# Patient Record
Sex: MALE | Race: WHITE | Employment: UNEMPLOYED | URBAN - METROPOLITAN AREA
[De-identification: names, ages, dates, MRNs, and addresses within clinical notes are randomized per-mention and may not be internally consistent; named-entity substitution may affect disease eponyms.]

---

## 2018-10-22 PROCEDURE — 96361 HYDRATE IV INFUSION ADD-ON: CPT

## 2018-10-28 ENCOUNTER — HOSPITAL ENCOUNTER (INPATIENT)
Age: 38
LOS: 3 days | Discharge: HOME | DRG: 641 | End: 2018-11-01
Attending: EMERGENCY MEDICINE | Admitting: INTERNAL MEDICINE
Payer: SELF-PAY

## 2018-10-28 DIAGNOSIS — R53.83 LETHARGY: Primary | ICD-10-CM

## 2018-10-28 DIAGNOSIS — R65.10 SIRS (SYSTEMIC INFLAMMATORY RESPONSE SYNDROME) (HCC): ICD-10-CM

## 2018-10-28 DIAGNOSIS — R41.0 DISORIENTATION: ICD-10-CM

## 2018-10-28 DIAGNOSIS — J04.0 LARYNGITIS: ICD-10-CM

## 2018-10-28 DIAGNOSIS — R41.82 ALTERED MENTAL STATUS, UNSPECIFIED ALTERED MENTAL STATUS TYPE: ICD-10-CM

## 2018-10-28 DIAGNOSIS — R47.1 DYSARTHRIA: ICD-10-CM

## 2018-10-28 DIAGNOSIS — E86.0 DEHYDRATION: ICD-10-CM

## 2018-10-28 DIAGNOSIS — C43.9 METASTATIC MELANOMA (HCC): ICD-10-CM

## 2018-10-28 LAB
ALBUMIN SERPL-MCNC: 3.2 G/DL (ref 3.5–5)
ALBUMIN/GLOB SERPL: 0.5 {RATIO} (ref 1.1–2.2)
ALP SERPL-CCNC: 82 U/L (ref 45–117)
ALT SERPL-CCNC: 19 U/L (ref 12–78)
ANION GAP SERPL CALC-SCNC: 14 MMOL/L (ref 5–15)
AST SERPL-CCNC: 32 U/L (ref 15–37)
BASOPHILS # BLD: 0.1 K/UL (ref 0–0.1)
BASOPHILS NFR BLD: 1 % (ref 0–1)
BILIRUB SERPL-MCNC: 0.7 MG/DL (ref 0.2–1)
BUN SERPL-MCNC: 18 MG/DL (ref 6–20)
BUN/CREAT SERPL: 19 (ref 12–20)
CALCIUM SERPL-MCNC: 12.2 MG/DL (ref 8.5–10.1)
CHLORIDE SERPL-SCNC: 96 MMOL/L (ref 97–108)
CO2 SERPL-SCNC: 21 MMOL/L (ref 21–32)
CREAT SERPL-MCNC: 0.93 MG/DL (ref 0.7–1.3)
DIFFERENTIAL METHOD BLD: ABNORMAL
EOSINOPHIL # BLD: 1.6 K/UL (ref 0–0.4)
EOSINOPHIL NFR BLD: 11 % (ref 0–7)
ERYTHROCYTE [DISTWIDTH] IN BLOOD BY AUTOMATED COUNT: 12.9 % (ref 11.5–14.5)
GLOBULIN SER CALC-MCNC: 6 G/DL (ref 2–4)
GLUCOSE SERPL-MCNC: 83 MG/DL (ref 65–100)
HCT VFR BLD AUTO: 37 % (ref 36.6–50.3)
HGB BLD-MCNC: 11.6 G/DL (ref 12.1–17)
IMM GRANULOCYTES # BLD: 0 K/UL (ref 0–0.04)
IMM GRANULOCYTES NFR BLD AUTO: 0 % (ref 0–0.5)
LYMPHOCYTES # BLD: 3.3 K/UL (ref 0.8–3.5)
LYMPHOCYTES NFR BLD: 23 % (ref 12–49)
MCH RBC QN AUTO: 26 PG (ref 26–34)
MCHC RBC AUTO-ENTMCNC: 31.4 G/DL (ref 30–36.5)
MCV RBC AUTO: 83 FL (ref 80–99)
MONOCYTES # BLD: 1.1 K/UL (ref 0–1)
MONOCYTES NFR BLD: 8 % (ref 5–13)
NEUTS SEG # BLD: 8.2 K/UL (ref 1.8–8)
NEUTS SEG NFR BLD: 57 % (ref 32–75)
NRBC # BLD: 0 K/UL (ref 0–0.01)
NRBC BLD-RTO: 0 PER 100 WBC
PLATELET # BLD AUTO: 633 K/UL (ref 150–400)
PMV BLD AUTO: 9 FL (ref 8.9–12.9)
POTASSIUM SERPL-SCNC: 4.2 MMOL/L (ref 3.5–5.1)
PROT SERPL-MCNC: 9.2 G/DL (ref 6.4–8.2)
RBC # BLD AUTO: 4.46 M/UL (ref 4.1–5.7)
RBC MORPH BLD: ABNORMAL
SODIUM SERPL-SCNC: 131 MMOL/L (ref 136–145)
WBC # BLD AUTO: 14.3 K/UL (ref 4.1–11.1)
WBC MORPH BLD: ABNORMAL

## 2018-10-28 PROCEDURE — 96361 HYDRATE IV INFUSION ADD-ON: CPT

## 2018-10-28 PROCEDURE — 96372 THER/PROPH/DIAG INJ SC/IM: CPT

## 2018-10-28 PROCEDURE — 96365 THER/PROPH/DIAG IV INF INIT: CPT

## 2018-10-28 PROCEDURE — 96375 TX/PRO/DX INJ NEW DRUG ADDON: CPT

## 2018-10-28 PROCEDURE — 96367 TX/PROPH/DG ADDL SEQ IV INF: CPT

## 2018-10-28 PROCEDURE — 85025 COMPLETE CBC W/AUTO DIFF WBC: CPT | Performed by: EMERGENCY MEDICINE

## 2018-10-28 PROCEDURE — 80053 COMPREHEN METABOLIC PANEL: CPT | Performed by: EMERGENCY MEDICINE

## 2018-10-28 PROCEDURE — 96366 THER/PROPH/DIAG IV INF ADDON: CPT

## 2018-10-28 PROCEDURE — 36415 COLL VENOUS BLD VENIPUNCTURE: CPT | Performed by: EMERGENCY MEDICINE

## 2018-10-28 PROCEDURE — 99285 EMERGENCY DEPT VISIT HI MDM: CPT

## 2018-10-29 ENCOUNTER — APPOINTMENT (OUTPATIENT)
Dept: GENERAL RADIOLOGY | Age: 38
DRG: 641 | End: 2018-10-29
Attending: EMERGENCY MEDICINE
Payer: SELF-PAY

## 2018-10-29 ENCOUNTER — APPOINTMENT (OUTPATIENT)
Dept: CT IMAGING | Age: 38
DRG: 641 | End: 2018-10-29
Attending: STUDENT IN AN ORGANIZED HEALTH CARE EDUCATION/TRAINING PROGRAM
Payer: SELF-PAY

## 2018-10-29 ENCOUNTER — APPOINTMENT (OUTPATIENT)
Dept: ULTRASOUND IMAGING | Age: 38
DRG: 641 | End: 2018-10-29
Attending: INTERNAL MEDICINE
Payer: SELF-PAY

## 2018-10-29 PROBLEM — R65.10 SIRS (SYSTEMIC INFLAMMATORY RESPONSE SYNDROME) (HCC): Status: ACTIVE | Noted: 2018-10-29

## 2018-10-29 LAB
AMPHET UR QL SCN: NEGATIVE
APPEARANCE UR: CLEAR
APTT PPP: 35.7 SEC (ref 22.1–32)
ATRIAL RATE: 104 BPM
BACTERIA URNS QL MICRO: NEGATIVE /HPF
BARBITURATES UR QL SCN: NEGATIVE
BENZODIAZ UR QL: NEGATIVE
BILIRUB UR QL CFM: NEGATIVE
CALCULATED P AXIS, ECG09: 41 DEGREES
CALCULATED R AXIS, ECG10: 28 DEGREES
CALCULATED T AXIS, ECG11: 28 DEGREES
CANNABINOIDS UR QL SCN: POSITIVE
COCAINE UR QL SCN: NEGATIVE
COLOR UR: ABNORMAL
DIAGNOSIS, 93000: NORMAL
DRUG SCRN COMMENT,DRGCM: ABNORMAL
EPITH CASTS URNS QL MICRO: ABNORMAL /LPF
FLUAV AG NPH QL IA: NEGATIVE
FLUBV AG NOSE QL IA: NEGATIVE
GLUCOSE UR STRIP.AUTO-MCNC: NEGATIVE MG/DL
HGB UR QL STRIP: NEGATIVE
HYALINE CASTS URNS QL MICRO: >20 /LPF (ref 0–5)
INR PPP: 1.5 (ref 0.9–1.1)
KETONES UR QL STRIP.AUTO: 40 MG/DL
LACTATE SERPL-SCNC: 0.8 MMOL/L (ref 0.4–2)
LACTATE SERPL-SCNC: 2.4 MMOL/L (ref 0.4–2)
LEUKOCYTE ESTERASE UR QL STRIP.AUTO: NEGATIVE
METHADONE UR QL: NEGATIVE
NITRITE UR QL STRIP.AUTO: NEGATIVE
OPIATES UR QL: POSITIVE
P-R INTERVAL, ECG05: 160 MS
PCP UR QL: NEGATIVE
PH UR STRIP: 5.5 [PH] (ref 5–8)
PROT UR STRIP-MCNC: NEGATIVE MG/DL
PROTHROMBIN TIME: 15.2 SEC (ref 9–11.1)
Q-T INTERVAL, ECG07: 342 MS
QRS DURATION, ECG06: 82 MS
QTC CALCULATION (BEZET), ECG08: 449 MS
RBC #/AREA URNS HPF: ABNORMAL /HPF (ref 0–5)
SP GR UR REFRACTOMETRY: 1.02 (ref 1–1.03)
THERAPEUTIC RANGE,PTTT: ABNORMAL SECS (ref 58–77)
UA: UC IF INDICATED,UAUC: ABNORMAL
UROBILINOGEN UR QL STRIP.AUTO: 1 EU/DL (ref 0.2–1)
VENTRICULAR RATE, ECG03: 104 BPM
WBC URNS QL MICRO: ABNORMAL /HPF (ref 0–4)

## 2018-10-29 PROCEDURE — 85610 PROTHROMBIN TIME: CPT | Performed by: INTERNAL MEDICINE

## 2018-10-29 PROCEDURE — 74011250636 HC RX REV CODE- 250/636: Performed by: EMERGENCY MEDICINE

## 2018-10-29 PROCEDURE — 95816 EEG AWAKE AND DROWSY: CPT | Performed by: PSYCHIATRY & NEUROLOGY

## 2018-10-29 PROCEDURE — 65660000000 HC RM CCU STEPDOWN

## 2018-10-29 PROCEDURE — 36415 COLL VENOUS BLD VENIPUNCTURE: CPT | Performed by: EMERGENCY MEDICINE

## 2018-10-29 PROCEDURE — 83605 ASSAY OF LACTIC ACID: CPT | Performed by: EMERGENCY MEDICINE

## 2018-10-29 PROCEDURE — 74011000250 HC RX REV CODE- 250: Performed by: INTERNAL MEDICINE

## 2018-10-29 PROCEDURE — 87040 BLOOD CULTURE FOR BACTERIA: CPT | Performed by: EMERGENCY MEDICINE

## 2018-10-29 PROCEDURE — 93971 EXTREMITY STUDY: CPT

## 2018-10-29 PROCEDURE — 96367 TX/PROPH/DG ADDL SEQ IV INF: CPT

## 2018-10-29 PROCEDURE — 96375 TX/PRO/DX INJ NEW DRUG ADDON: CPT

## 2018-10-29 PROCEDURE — 92610 EVALUATE SWALLOWING FUNCTION: CPT

## 2018-10-29 PROCEDURE — 74011000258 HC RX REV CODE- 258: Performed by: EMERGENCY MEDICINE

## 2018-10-29 PROCEDURE — 96365 THER/PROPH/DIAG IV INF INIT: CPT

## 2018-10-29 PROCEDURE — 71046 X-RAY EXAM CHEST 2 VIEWS: CPT

## 2018-10-29 PROCEDURE — 74011250637 HC RX REV CODE- 250/637: Performed by: INTERNAL MEDICINE

## 2018-10-29 PROCEDURE — 96366 THER/PROPH/DIAG IV INF ADDON: CPT

## 2018-10-29 PROCEDURE — 81001 URINALYSIS AUTO W/SCOPE: CPT | Performed by: EMERGENCY MEDICINE

## 2018-10-29 PROCEDURE — 80307 DRUG TEST PRSMV CHEM ANLYZR: CPT | Performed by: INTERNAL MEDICINE

## 2018-10-29 PROCEDURE — 96372 THER/PROPH/DIAG INJ SC/IM: CPT

## 2018-10-29 PROCEDURE — 70450 CT HEAD/BRAIN W/O DYE: CPT

## 2018-10-29 PROCEDURE — 96361 HYDRATE IV INFUSION ADD-ON: CPT

## 2018-10-29 PROCEDURE — 85730 THROMBOPLASTIN TIME PARTIAL: CPT | Performed by: INTERNAL MEDICINE

## 2018-10-29 PROCEDURE — 74011250636 HC RX REV CODE- 250/636: Performed by: INTERNAL MEDICINE

## 2018-10-29 PROCEDURE — 93005 ELECTROCARDIOGRAM TRACING: CPT

## 2018-10-29 PROCEDURE — 87804 INFLUENZA ASSAY W/OPTIC: CPT | Performed by: EMERGENCY MEDICINE

## 2018-10-29 RX ORDER — LEVOFLOXACIN 5 MG/ML
750 INJECTION, SOLUTION INTRAVENOUS EVERY 24 HOURS
Status: DISCONTINUED | OUTPATIENT
Start: 2018-10-29 | End: 2018-10-31

## 2018-10-29 RX ORDER — ONDANSETRON 2 MG/ML
4 INJECTION INTRAMUSCULAR; INTRAVENOUS
Status: DISCONTINUED | OUTPATIENT
Start: 2018-10-29 | End: 2018-11-01 | Stop reason: HOSPADM

## 2018-10-29 RX ORDER — ACETAMINOPHEN 650 MG/1
650 SUPPOSITORY RECTAL
Status: DISCONTINUED | OUTPATIENT
Start: 2018-10-29 | End: 2018-11-01 | Stop reason: HOSPADM

## 2018-10-29 RX ORDER — LEVOFLOXACIN 5 MG/ML
750 INJECTION, SOLUTION INTRAVENOUS EVERY 24 HOURS
Status: DISCONTINUED | OUTPATIENT
Start: 2018-10-30 | End: 2018-10-29 | Stop reason: SDUPTHER

## 2018-10-29 RX ORDER — GUAIFENESIN 100 MG/5ML
81 LIQUID (ML) ORAL DAILY
Status: DISCONTINUED | OUTPATIENT
Start: 2018-10-29 | End: 2018-11-01 | Stop reason: HOSPADM

## 2018-10-29 RX ORDER — SODIUM CHLORIDE 0.9 % (FLUSH) 0.9 %
5-10 SYRINGE (ML) INJECTION EVERY 8 HOURS
Status: DISCONTINUED | OUTPATIENT
Start: 2018-10-29 | End: 2018-11-01 | Stop reason: HOSPADM

## 2018-10-29 RX ORDER — ADHESIVE BANDAGE
30 BANDAGE TOPICAL DAILY PRN
Status: DISCONTINUED | OUTPATIENT
Start: 2018-10-29 | End: 2018-11-01 | Stop reason: HOSPADM

## 2018-10-29 RX ORDER — SODIUM CHLORIDE 0.9 % (FLUSH) 0.9 %
5-10 SYRINGE (ML) INJECTION AS NEEDED
Status: DISCONTINUED | OUTPATIENT
Start: 2018-10-29 | End: 2018-11-01 | Stop reason: HOSPADM

## 2018-10-29 RX ORDER — POLYETHYLENE GLYCOL 3350 17 G/17G
17 POWDER, FOR SOLUTION ORAL DAILY
Status: DISCONTINUED | OUTPATIENT
Start: 2018-10-29 | End: 2018-11-01 | Stop reason: HOSPADM

## 2018-10-29 RX ORDER — ENOXAPARIN SODIUM 100 MG/ML
40 INJECTION SUBCUTANEOUS EVERY 24 HOURS
Status: DISCONTINUED | OUTPATIENT
Start: 2018-10-29 | End: 2018-11-01 | Stop reason: HOSPADM

## 2018-10-29 RX ORDER — ACETAMINOPHEN 325 MG/1
650 TABLET ORAL
Status: DISCONTINUED | OUTPATIENT
Start: 2018-10-29 | End: 2018-11-01 | Stop reason: HOSPADM

## 2018-10-29 RX ORDER — SODIUM CHLORIDE 9 MG/ML
75 INJECTION, SOLUTION INTRAVENOUS CONTINUOUS
Status: DISCONTINUED | OUTPATIENT
Start: 2018-10-29 | End: 2018-11-01 | Stop reason: HOSPADM

## 2018-10-29 RX ADMIN — SODIUM CHLORIDE 1000 ML: 900 INJECTION, SOLUTION INTRAVENOUS at 00:54

## 2018-10-29 RX ADMIN — ASPIRIN 81 MG CHEWABLE TABLET 81 MG: 81 TABLET CHEWABLE at 10:18

## 2018-10-29 RX ADMIN — CEFEPIME HYDROCHLORIDE 2 G: 2 INJECTION, POWDER, FOR SOLUTION INTRAVENOUS at 13:35

## 2018-10-29 RX ADMIN — POLYETHYLENE GLYCOL 3350 17 G: 17 POWDER, FOR SOLUTION ORAL at 10:18

## 2018-10-29 RX ADMIN — SODIUM CHLORIDE 1000 ML: 900 INJECTION, SOLUTION INTRAVENOUS at 01:41

## 2018-10-29 RX ADMIN — Medication 10 ML: at 13:36

## 2018-10-29 RX ADMIN — ONDANSETRON 4 MG: 2 INJECTION, SOLUTION INTRAMUSCULAR; INTRAVENOUS at 10:25

## 2018-10-29 RX ADMIN — CEFEPIME HYDROCHLORIDE 2 G: 2 INJECTION, POWDER, FOR SOLUTION INTRAVENOUS at 04:20

## 2018-10-29 RX ADMIN — ENOXAPARIN SODIUM 40 MG: 40 INJECTION, SOLUTION INTRAVENOUS; SUBCUTANEOUS at 10:25

## 2018-10-29 RX ADMIN — LEVOFLOXACIN 750 MG: 5 INJECTION, SOLUTION INTRAVENOUS at 05:01

## 2018-10-29 RX ADMIN — Medication 10 ML: at 22:00

## 2018-10-29 RX ADMIN — SODIUM CHLORIDE 125 ML/HR: 900 INJECTION, SOLUTION INTRAVENOUS at 10:25

## 2018-10-29 RX ADMIN — CEFEPIME HYDROCHLORIDE 2 G: 2 INJECTION, POWDER, FOR SOLUTION INTRAVENOUS at 19:45

## 2018-10-29 NOTE — ED NOTES
Pending admission evaluation and orders at this time. Mother at bedside. Patient resting in room with blanket over his head. No pending orders at this time.

## 2018-10-29 NOTE — PROGRESS NOTES
Speech Pathology bedside swallow evaluation/discharge Patient: Tammy Case (41 y.o. male) Date: 10/29/2018 Primary Diagnosis: SIRS (systemic inflammatory response syndrome) (HCC) Precautions:     
 
ASSESSMENT : 
Based on the objective data described below, the patient presents with an intact oropharyngeal swallow. Patient with timely/complete mastication, timely swallow initiation, functional hyolaryngeal elevation/excursion, and no overt s/s aspiration observed. Patient is on a clear liquid diet, and suspect this is due to nausea as patient with green emesis bag at bedside. Patient with fluent aphasia, jargon, decreased content, and confabulations. Patient oriented to person, however stated place as [facility], situation as \"for the drugs,\" and year as 1900. Skilled therapy provided by a speech-language pathologist for swallowing is not indicated at this time. However, patient may benefit from further evaluation of language/cognition. PLAN : 
Recommendations: 
--Currently on clear liquids, however patient is safe for regular/thin liquid diet from an oropharyngeal swallow standpoint as soon as medically appropriate 
--Straws ok 
--Meds as tolerated 
--SLP to follow for further evaluation of language/cognition as indicated Discharge Recommendations: To Be Determined SUBJECTIVE:  
Patient stated for the drugs when asked why he is at the [facility]. \" Oriented to person only. OBJECTIVE:  
 
Past Medical History:  
Diagnosis Date  Cancer ([facility] [place] [address] History reviewed. No pertinent surgical history. Prior Level of Function/Home Situation:  
 Diet prior to admission: suspect regular/thin Current Diet:  Clear liquids Cognitive and Communication Status: 
Neurologic State: Alert, Confused Orientation Level: Oriented to person, Disoriented to time, Disoriented to situation, Disoriented to place Cognition: Decreased command following, Decreased attention/concentration Perception: Appears intact Perseveration: No perseveration noted Safety/Judgement: Decreased awareness of environment, Decreased awareness of need for assistance, Decreased awareness of need for safety, Decreased insight into deficits Oral Assessment: 
Oral Assessment Labial: Left droop(mild) Dentition: Natural;Full Oral Hygiene: moist oral mucosa Lingual: No impairment Velum: No impairment Mandible: No impairment P.O. Trials: 
Patient Position: upright in bed Vocal quality prior to P.O.: No impairment Consistency Presented: Ice chips; Thin liquid;Puree; Solid How Presented: Self-fed/presented;Cup/sip;Cup/gulp; Spoon;Straw;Successive swallows Bolus Acceptance: No impairment Bolus Formation/Control: No impairment Propulsion: No impairment Oral Residue: None Initiation of Swallow: No impairment Laryngeal Elevation: Functional 
Aspiration Signs/Symptoms: None Pharyngeal Phase Characteristics: No impairment, issues, or problems Effective Modifications: None Cues for Modifications: None Oral Phase Severity: No impairment Pharyngeal Phase Severity : No impairment NOMS:  
The NOMS functional outcome measure was used to quantify this patient's level of swallowing impairment. Based on the NOMS, the patient was determined to be at level 7 for swallow function [facility] Swallowing Levels: 
Level 1 (CN): NPO Level 2 (CM): NPO but takes consistency in therapy Level 3 (CL): Takes less than 50% of nutrition p.o. and continues with nonoral feedings; and/or safe with mod cues; and/or max diet restriction Level 4 (CK): Safe swallow but needs mod cues; and/or mod diet restriction; and/or still requires some nonoral feeding/supplements Level 5 (CJ): Safe swallow with min diet restriction; and/or needs min cues Level 6 (CI): Independent with p.o.; rare cues; usually self cues; may need to avoid some foods or needs extra time Level 7 (CH): Independent for all p.o. JOEY. (2003). National Outcomes Measurement System (NOMS): Adult Speech-Language Pathology User's Guide. Pain: 
Pain Scale 1: Numeric (0 - 10) Pain Intensity 1: 0 After treatment:  
[] Patient left in no apparent distress sitting up in chair 
[x] Patient left in no apparent distress in bed 
[x] Call bell left within reach [x] Nursing notified 
[] Caregiver present 
[] Bed alarm activated COMMUNICATION/EDUCATION:  
The patients plan of care including findings, recommendations, and recommended diet changes were discussed with: Registered Nurse. 
 
[] Patient/family have participated as able and agree with findings and recommendations. [x] Patient is unable to participate in plan of care at this time. Thank you for this referral. 
Francoise Cole, SLP Time Calculation: 15 mins

## 2018-10-29 NOTE — PROGRESS NOTES
* No surgery found * 
* No surgery found * Bedside shift change report given to Dinah Medina [phone] (oncoming nurse) by Norma Lynne (offgoing nurse). Report included the following information [facility]. [facility] Phone:   [phone] Significant changes during shift:  New admit from ER. DRug screen sent Patient Information Ирина Lozano 
38 y.o. 
10/28/2018 11:56 PM by Simón Ponce MD. Ирина Lozano was admitted from Home 
 
Problem List 
 
Patient Active Problem List  
 Diagnosis Date Noted  SIRS (systemic inflammatory response syndrome) ([facility]) 10/29/2018 Past Medical History:  
Diagnosis Date  Cancer ([facility] [address] Core Measures: CVA: Yes Yes CHF:No No 
PNA:No No 
 
Activity Status: OOB to Chair No 
Ambulated this shift No  
Bed Rest No 
 
Supplemental O2: (If Applicable) NC No 
NRB No 
Venti-mask No 
On  Liters/min LINES AND DRAINS: 
CDVT prophylaxis: DVT prophylaxis Med- Yes DVT prophylaxis SCD or JUSTA- No  
 
Wounds: (If Applicable) Wounds- No 
 
Location Patient Safety: 
 
Falls Score Total Score: 3 Safety Level_______ Bed Alarm On? Yes Sitter? No 
 
Plan for upcoming shift: MRI EEG Discharge Plan: Yes TBD Active Consults: 
IP CONSULT TO HOSPITALIST 
IP CONSULT TO NEUROLOGY 
IP CONSULT TO ONCOLOGY

## 2018-10-29 NOTE — PROGRESS NOTES
Initial Nutrition Assessment: 
 
INTERVENTIONS/RECOMMENDATIONS:  
· Meals/Snacks: General/healthful diet: Advance to Regular diet as tolerated · Supplements: Commercial supplement: Add ensure clear BID ASSESSMENT:  
Patient medically noted for SIRS. PMH for metastatic melanoma. Currently receiving a clear liquid diet. Speech has cleared patient for regular consistencies. Sleeping at time of attempted visit, no family at bedside. Notes indicate patient is confused. No recent weight history per chart review. MST received for weight loss and poor PO intake PTA. Will add ensure clear while receiving clear liquids. Will monitor diet advancement and appetite/PO. RD to follow up to discuss recent nutrition history and adjust diet/supplements as needed. Diet Order: Clear liquids 
% Eaten:  No data found. Pertinent Medications: [x]Reviewed []Other: Miralax Pertinent Labs: [x]Reviewed []Other: 
Food Allergies: [x]None []Other Last BM: 10/28  []Active     []Hyperactive  []Hypoactive       [] Absent BS Skin:    [x] Intact   [] Incision  [] Breakdown: [] Edema []Other: Anthropometrics:  
Height: 5' 8\" (172.7 cm) Weight: 70.2 kg (154 lb 12.2 oz) IBW (%IBW):   ( ) UBW (%UBW):   (  %) Last Weight Metrics: 
Weight Loss Metrics 10/28/2018 Today's Wt 154 lb 12.2 oz  
BMI 23.53 kg/m2 BMI: Body mass index is 23.53 kg/m². This BMI is indicative of: 
 []Underweight    [x]Normal    []Overweight    [] Obesity   [] Extreme Obesity (BMI>40) Estimated Nutrition Needs (Based on):  
2079 Kcals/day(BMR (1600) x 1. 3AF) , 84 g(1.2 g/kg bw) Protein Carbohydrate: At Least 130 g/day  Fluids: 2100 mL/day (1ml/kcal) Pt expected to meet estimated nutrient needs: []Yes [x]No 
 
NUTRITION DIAGNOSES:  
Problem:  Predicted suboptimal energy intake Etiology: related to current medical condition Signs/Symptoms: as evidenced by MST, AMS NUTRITION INTERVENTIONS: 
 Meals/Snacks: General/healthful diet   Supplements: Commercial supplement GOAL:  
PO intake >50% of meals/supplements next 1-3 days LEARNING NEEDS (Diet, Food/Nutrient-Drug Interaction):  
 [x] None Identified 
 [] Identified and Education Provided/Documented 
 [] Identified and Pt declined/was not appropriate Cultural, Mandaen, OR Ethnic Dietary Needs:  
 [x] None Identified 
 [] Identified and Addressed 
 
 [x] Interdisciplinary Care Plan Reviewed/Documented  
 [x] Discharge Planning: Regular diet MONITORING /EVALUATION:  
Food/Nutrient Intake Outcomes: Total energy intake Physical Signs/Symptoms Outcomes: Weight/weight change NUTRITION RISK:  
 [x] High              [] Moderate           []  Low  []  Minimal/Uncompromised PT SEEN FOR:  
 [x]  MD Consult: []Calorie Count []Diabetic Diet Education []Diet Education []Electrolyte Management 
   [x]General Nutrition Management and Supplements []Management of Tube Feeding []TPN Recommendations [x]  RN Referral:  [x]MST score >=2 
   []Enteral/Parenteral Nutrition PTA []Pregnant: Gestational DM or Multigestation 
   []Pressure Ulcer/Wound Care needs 
     
[]  Low BMI 
[]  SHAHRZAD Pham Rowell Pager [phone] Weekend Pager [phone]

## 2018-10-29 NOTE — CONSULTS
[address]     Dorene Ormond  MR#: [id number]  : 1980  ACCOUNT #: [de-identified]   DATE OF SERVICE: 10/29/2018    REASON FOR CONSULTATION:  Metastatic melanoma. REFERRING PHYSICIAN: Arleen Wood DO    HISTORY OF PRESENT ILLNESS:  The patient is a 58-year-old gentleman who has been treated for metastatic melanoma down at [facility] in [facility]. I do not have any records. I am getting the information from his mother. His mom reports that he was diagnosed with this melanoma sometime last spring he ended up having surgery. He had lymph nodes removed that were positive. He had signs of metastatic disease by CT scan. He was started on immunotherapy. She does not know what type. Recently, he had a repeat CT scan that she reports showed that things had not improved like they had wanted it to. They were sent up to [facility] to see a melanoma specialist there. Once again, I do not have any of those records. The mother reports that they were told that they wanted to see how he would respond to the immunotherapy. Sometimes you could have pseudoprogression with it. The patient was supposed to get a brain MRI and a repeat CT scan and to return to [facility] sometime the beginning of November. However, he was admitted with confusion by his mom brought him to the hospital.  He had a CT scan of his head that was negative. Brain MRI has been ordered. The patient reports that he is feeling fairly well. He still seems slightly confused and is really only oriented to person, but he has no other complaints. PAST MEDICAL HISTORY:  Metastatic melanoma as above. FAMILY HISTORY:  Noncontributory. SOCIAL HISTORY:  Does not smoke, he does not drink. ALLERGIES:  NO KNOWN DRUG ALLERGIES. MEDICATIONS:  The hospitalist have him on Levaquin, cefepime, Lovenox prophylactic dose, MiraLax, aspirin. REVIEW OF SYSTEMS:  Negative except for as above.     PHYSICAL EXAMINATION:  VITAL SIGNS:  Temperature 99.2, pulse 98, blood pressure 93/58, respirations 16, satting 97% on room air. GENERAL:  Lying in bed in no acute distress. HEENT:  Normocephalic, atraumatic. Oropharynx clear, without lesion. NECK:  Supple. No cervical, supraclavicular or axillary lymphadenopathy appreciated. HEART:  Regular rate and rhythm. LUNGS:  Clear to auscultation bilaterally. ABDOMEN:  Normal bowel sounds. Soft, nontender, nondistended. No hepatosplenomegaly. EXTREMITIES:  No clubbing, cyanosis or edema. NEUROLOGIC:  Nonfocal, but he is only alert and oriented x1. LABORATORY DATA:  White blood cell count 14.3, hemoglobin 11.6, platelets 675. Chemistry:  Sodium 131, potassium 4.2, BUN 18, creatinine 0.93, total bilirubin 0.7, ALT 19, AST 32, alkaline phosphatase 82. IMPRESSION AND PLAN:  The patient is a 27-year-old gentleman with a history of metastatic melanoma. I do not have any of the records. According to his mom, he has been on immune therapy. We are going to get records from [place] as well as [facility]. His head CT is negative. A brain MRI has been ordered. We will see what that shows. We will continue to follow along with you and make further recommendations as needed. Once he is able to be discharged, he need to follow up with Oncologist in [place] and also up at [facility].       MD TIFFANY Sosa / MAGGY  D: 10/29/2018 14:12     T: 10/29/2018 15:27  JOB #: [id number]

## 2018-10-29 NOTE — ROUTINE PROCESS
-Please complete MRI History and Safety Screening Form for this patient using [employer] only under Orders Requiring a Screening Form: 
 

## 2018-10-29 NOTE — H&P
Hospitalist Admission NoteNAME: Ирина Lozano :  1980 MRN:  [id number] Date/Time:  10/29/2018 8:41 AM 
 
Patient PCP: Casa Lopez MD 
______________________________________________________________________ Given the patient's current clinical presentation, I have a high level of concern for decompensation if discharged from the emergency department. Complex decision making was performed, which includes reviewing the patient's available past medical records, laboratory results, and x-ray films. My assessment of this patient's clinical condition and my plan of care is as follows. Assessment / Plan: 
history of metastatic melanoma. get records from [facility] Onco consult Altered ms-  
Slurred speech /facial droop ?new Ct head ngt Mri brain/ 
Neurology to see Zack Cason for now Check tox screen Rt groin pain s/p surgery R/o abscess / seroma Get us Elevated Lactic  Acid- likely dehydration Ua/xrays ngt No signs of meningitis Sepsis  protocol 
initial la 2.4 trend la Cont wIVF Cont broad spectrum abx for now Follow blood cx/ua cx Check flu swab Hyponatremia IVF for now Recheck labs in am 
 
Code Status: Full code Surrogate Decision Maker:mother [id number] 
 DVT Prophylaxis: lovenox GI Prophylaxis: not indicated Baseline: ambulatory Subjective: CHIEF COMPLAINT: Lethargy HISTORY OF PRESENT ILLNESS:    Per records- pt sleeping Ирина Lozano is a 41 y.o.  male with PMHx significant for melanoma with metastatic disease, presents via private vehicle to the ED with cc of altered mental status and lethargy for one day. The pt's mother came to visit him today and she was concerned that he was thinking slowly and \"not acting like himself\". He seemed confused.  The pt's mother planned to take him home to watch him for the night but she became concerned by his confusion on the drive home and took him here to the ED. The pt was apparently diagnosed with melanoma in his R popliteal fossa in May. He underwent lymph node biopsy at a hospital in the [address] [place] [address] in June or July and has been receiving some form of immunotherapy for several months out of [place] and has recently been reffered to [facility]. The pt was seen at [facility] and has not initiated therapy there yet.  
  
The pt is not sure when his last treatment was, he thinks possibly three weeks ago, does not know the name of his treatment regimen, the name of his oncologist, or the duration of his therapy.  
  
When pt examined in room - mild slurred speech and facial droop - mother think it is new We were asked to admit for work up and evaluation of the above problems. Past Medical History:  
Diagnosis Date  Cancer ([facility] [address] History reviewed. No pertinent surgical history. Social History Tobacco Use  Smoking status: Never Smoker  Smokeless tobacco: Never Used Substance Use Topics  Alcohol use: No  
  Frequency: Never History reviewed. No pertinent family history. No Known Allergies Prior to Admission medications Not on File REVIEW OF SYSTEMS:    
I am not able to complete the review of systems because: The patient is intubated and sedated The patient has altered mental status due to his acute medical problems The patient has baseline aphasia from prior stroke(s) The patient has baseline dementia and is not reliable historian The patient is in acute medical distress and unable to provide information Total of 12 systems reviewed as follows:   
   POSITIVE= underlined text  Negative = text not underlined General:  fever, chills, sweats, generalized weakness, weight loss/gain,  
   loss of appetite Eyes:    blurred vision, eye pain, loss of vision, double vision ENT:    rhinorrhea, pharyngitis Respiratory:   cough, sputum production, SOB, HOFFMAN, wheezing, pleuritic pain  
Cardiology:   chest pain, palpitations, orthopnea, PND, edema, syncope Gastrointestinal:  abdominal pain , N/V, diarrhea, dysphagia, constipation, bleeding Genitourinary:  frequency, urgency, dysuria, hematuria, incontinence Muskuloskeletal :  arthralgia, myalgia, back pain Hematology:  easy bruising, nose or gum bleeding, lymphadenopathy Dermatological: rash, ulceration, pruritis, color change / jaundice Endocrine:   hot flashes or polydipsia Neurological:  headache, dizziness, confusion, focal weakness, paresthesia, Speech difficulties, memory loss, gait difficulty Psychological: Feelings of anxiety, depression, agitation Objective: VITALS:   
Visit Vitals BP 95/66 Pulse 100 Temp 99.1 °F (37.3 °C) Resp 16 Ht 5' 8\" (1.727 m) Wt 70.2 kg (154 lb 12.2 oz) SpO2 96% BMI 23.53 kg/m² PHYSICAL EXAM: 
 
General:    Alert, cooperative, no distress, appears stated age. HEENT: Atraumatic, anicteric sclerae, pink conjunctivae No oral ulcers, mucosa moist, throat clear, dentition fair Neck:  Supple, symmetrical,  thyroid: non tender Lungs:   Clear to auscultation bilaterally. No Wheezing or Rhonchi. No rales. Chest wall:  No tenderness  No Accessory muscle use. Heart:   Regular  rhythm,  No  murmur   No edema Abdomen:   Soft, non-tender. Not distended. Bowel sounds normal 
Extremities: No cyanosis. No clubbing,   
  Skin turgor normal, Capillary refill normal, Radial dial pulse 2+ Skin:     Not pale. Not Jaundiced  No rashes  Left thih upper scar 
 well healed + ttp hard no erythema lft knee scar well healed Psych:  Good insight. Not depressed. Not anxious or agitated. Neurologic: EOMs intact. ++ facial asymmetry. No aphasia mild slurred speech. Symmetrical strength, Sensation grossly intact. Alert and oriented X 4. _______________________________________________________________________ Care Plan discussed with: 
  Comments Patient x Family  x   
RN x Care Manager Consultant:  x   
_______________________________________________________________________ Expected  Disposition:  
Home with Family x HH/PT/OT/RN   
SNF/LTC   
MARGARET   
________________________________________________________________________ TOTAL TIME:  90  Minutes Critical Care Provided     Minutes non procedure based Comments  
 x Reviewed previous records  
>50% of visit spent in counseling and coordination of care x Discussion with patient and/or family and questions answered 
  
 
________________________________________________________________________ Signed: Marisol Peoples MD 
 
Procedures: see electronic medical records for all procedures/Xrays and details which were not copied into this note but were reviewed prior to creation of Plan. LAB DATA REVIEWED:   
Recent Results (from the past 24 hour(s)) CBC WITH AUTOMATED DIFF Collection Time: 10/28/18 10:01 PM  
Result Value Ref Range WBC 14.3 (H) 4.1 - 11.1 K/uL  
 RBC 4.46 4.10 - 5.70 M/uL  
 HGB 11.6 (L) 12.1 - 17.0 g/dL HCT 37.0 36.6 - 50.3 % MCV 83.0 80.0 - 99.0 FL  
 MCH 26.0 26.0 - 34.0 PG  
 MCHC 31.4 30.0 - 36.5 g/dL  
 RDW 12.9 11.5 - 14.5 % PLATELET 937 (H) 928 - 400 K/uL MPV 9.0 8.9 - 12.9 FL  
 NRBC 0.0 0  WBC ABSOLUTE NRBC 0.00 0.00 - 0.01 K/uL NEUTROPHILS 57 32 - 75 % LYMPHOCYTES 23 12 - 49 % MONOCYTES 8 5 - 13 % EOSINOPHILS 11 (H) 0 - 7 % BASOPHILS 1 0 - 1 % IMMATURE GRANULOCYTES 0 0.0 - 0.5 % ABS. NEUTROPHILS 8.2 (H) 1.8 - 8.0 K/UL  
 ABS. LYMPHOCYTES 3.3 0.8 - 3.5 K/UL  
 ABS. MONOCYTES 1.1 (H) 0.0 - 1.0 K/UL  
 ABS. EOSINOPHILS 1.6 (H) 0.0 - 0.4 K/UL  
 ABS. BASOPHILS 0.1 0.0 - 0.1 K/UL  
 ABS. IMM.  GRANS. 0.0 0.00 - 0.04 K/UL  
 DF SMEAR SCANNED    
 RBC COMMENTS NORMOCYTIC, NORMOCHROMIC    
 WBC COMMENTS ATYPICAL LYMPHOCYTES PRESENT    
METABOLIC PANEL, COMPREHENSIVE Collection Time: 10/28/18 10:01 PM  
Result Value Ref Range Sodium 131 (L) 136 - 145 mmol/L Potassium 4.2 3.5 - 5.1 mmol/L Chloride 96 (L) 97 - 108 mmol/L  
 CO2 21 21 - 32 mmol/L Anion gap 14 5 - 15 mmol/L Glucose 83 65 - 100 mg/dL BUN 18 6 - 20 MG/DL Creatinine 0.93 0.70 - 1.30 MG/DL  
 BUN/Creatinine ratio 19 12 - 20 GFR est AA >60 >60 ml/min/1.73m2 GFR est non-AA >60 >60 ml/min/1.73m2 Calcium 12.2 (H) 8.5 - 10.1 MG/DL Bilirubin, total 0.7 0.2 - 1.0 MG/DL  
 ALT (SGPT) 19 12 - 78 U/L  
 AST (SGOT) 32 15 - 37 U/L Alk. phosphatase 82 45 - 117 U/L Protein, total 9.2 (H) 6.4 - 8.2 g/dL Albumin 3.2 (L) 3.5 - 5.0 g/dL Globulin 6.0 (H) 2.0 - 4.0 g/dL A-G Ratio 0.5 (L) 1.1 - 2.2 EKG, 12 LEAD, INITIAL Collection Time: 10/29/18 12:15 AM  
Result Value Ref Range Ventricular Rate 104 BPM  
 Atrial Rate 104 BPM  
 P-R Interval 160 ms QRS Duration 82 ms Q-T Interval 342 ms QTC Calculation (Bezet) 449 ms Calculated P Axis 41 degrees Calculated R Axis 28 degrees Calculated T Axis 28 degrees Diagnosis Sinus tachycardia No previous ECGs available CULTURE, BLOOD, PAIRED Collection Time: 10/29/18 12:30 AM  
Result Value Ref Range Special Requests: NO SPECIAL REQUESTS Culture result: NO GROWTH AFTER 6 HOURS    
LACTIC ACID Collection Time: 10/29/18 12:52 AM  
Result Value Ref Range Lactic acid 2.4 (HH) 0.4 - 2.0 MMOL/L  
URINALYSIS W/ REFLEX CULTURE Collection Time: 10/29/18  2:45 AM  
Result Value Ref Range Color YELLOW/STRAW Appearance CLEAR CLEAR Specific gravity 1.017 1.003 - 1.030    
 pH (UA) 5.5 5.0 - 8.0 Protein NEGATIVE  NEG mg/dL Glucose NEGATIVE  NEG mg/dL Ketone 40 (A) NEG mg/dL Blood NEGATIVE  NEG Urobilinogen 1.0 0.2 - 1.0 EU/dL Nitrites NEGATIVE  NEG  Leukocyte Esterase NEGATIVE  NEG    
 WBC 0-4 0 - 4 /hpf  
 RBC 0-5 0 - 5 /hpf Epithelial cells FEW FEW /lpf Bacteria NEGATIVE  NEG /hpf  
 UA:UC IF INDICATED CULTURE NOT INDICATED BY UA RESULT CNI Hyaline cast >20 (H) 0 - 5 /lpf  
BILIRUBIN, CONFIRM Collection Time: 10/29/18  2:45 AM  
Result Value Ref Range Bilirubin UA, confirm NEGATIVE  NEG    
LACTIC ACID Collection Time: 10/29/18  4:30 AM  
Result Value Ref Range Lactic acid 0.8 0.4 - 2.0 MMOL/L  
INFLUENZA A & B AG (RAPID TEST) Collection Time: 10/29/18  4:30 AM  
Result Value Ref Range Influenza A Antigen NEGATIVE  NEG  Influenza B Antigen NEGATIVE  NEG

## 2018-10-29 NOTE — ED NOTES
TRANSFER - OUT REPORT: 
 
Verbal report given to Sandra/Gladys RN(name) on Catherine Valentin  being transferred to Neuro(unit) for routine progression of care Report consisted of patients Situation, Background, Assessment and  
Recommendations(SBAR). Information from the following report(s) SBAR, Kardex, ED Summary and MAR was reviewed with the receiving nurse. Lines:  
Peripheral IV 10/29/18 Left Antecubital (Active) Site Assessment Clean, dry, & intact 10/29/2018  1:01 AM  
Phlebitis Assessment 0 10/29/2018  1:01 AM  
Infiltration Assessment 0 10/29/2018  1:01 AM  
Dressing Status Clean, dry, & intact 10/29/2018  1:01 AM  
Dressing Type Transparent 10/29/2018  1:01 AM  
Hub Color/Line Status Pink 10/29/2018  1:01 AM  
Action Taken Blood drawn 10/29/2018  1:01 AM  
  
 
Opportunity for questions and clarification was provided. Patient transported with: 
transport

## 2018-10-29 NOTE — ED NOTES
Upon [facility] pt left leg weak, r/t lymphnode removal per pt. Sensation still present. Pedal pulse palpable.

## 2018-10-29 NOTE — PROGRESS NOTES
11:00 MEWS was three due to elevated heart rate at time of VS. Hr immediately after that in high 90's.

## 2018-10-29 NOTE — PROGRESS NOTES
Pharmacy Automatic Renal Dosing Protocol - Antimicrobials Indication for Antimicrobials: Sepsis Current Regimen of Each Antimicrobial: 
Cefepime 2gm IV q8h - started 10/29 days 1 Levofloxacin 750mg IV q24h - started 10/29 day 1 Previous Antimicrobial Therapy: 
None Significant Cultures:  
10/29 influenza - negative 10/29 PBCx - pending Radiology / Imaging results: (X-ray, CT scan or MRI):  
10/29 CT head -  unremarkable 10/29 CXR- lungs clear Paralysis, amputations, malnutrition: none Labs: 
Recent Labs 10/28/18 
2201 CREA 0.93 BUN 18 WBC 14.3* Temp (24hrs), Av.2 °F (37.3 °C), Min:98.7 °F (37.1 °C), Max:100.2 °F (37.9 °C) Creatinine Clearance (mL/min) or Dialysis:  
Estimated Creatinine Clearance: 105.2 mL/min (based on SCr of 0.93 mg/dL). Estimated Creatinine Clearance (using IBW):105.2 mL/min Impression/Plan:  
· Initiate Cefepime and Levofloxacin as ordered above · Antimicrobial stop date - pending Pharmacy will follow daily and adjust medications as appropriate for renal function and/or serum levels.  
 
Thank you, 
Gonzalo Tompkins, [facility]

## 2018-10-29 NOTE — PROGRESS NOTES
* No surgery found * 
* No surgery found * Bedside shift change report given to Via Wilfredo Dent (oncoming nurse) by Todd Green (offgoing nurse). Report included the following information SBAR. 
  
Zone Phone:   
  
  
Significant changes during shift:  nonet 
  
  
  
Patient Information 
  
Jerman Amaya 
41 y.o. 
10/28/2018 11:56 PM by Donald Garcia MD. Jerman Amaya was admitted from Home 
  
Problem List 
  
    
Patient Active Problem List  
  Diagnosis Date Noted  S[facility] 10/29/2018  
  
    
Past Medical History:  
Diagnosis Date  Cancer ([facility]    
  
  
  
Core Measures: 
  
CVA: Yes Yes 
  
Activity Status: 
  
OOB to Chair No 
Ambulated this shift No  
Bed Rest No 
  
Supplemental O2: (If Applicable) 
  
NC No 
NRB No 
Venti-mask No 
On  Liters/min 
  
  
LINES AND DRAINS: 
  
CDVT prophylaxis: 
  
DVT prophylaxis Med- Yes DVT prophylaxis SCD or JUSTA- No  
  
Wounds: (If Applicable) 
  
Wounds- No 
  
Location  
  
Patient Safety: 
  
Falls Score Total Score: 3 Safety Level_______ Bed Alarm On? Yes Sitter? No 
  
Plan for upcoming shift: MRI EEG 
  
  
  
Discharge Plan: TBD after testing and evals 
  
Active Consults: 
IP CONSULT TO HOSPITALIST 
IP CONSULT TO NEUROLOGY 
IP CONSULT TO ONCOLOGY

## 2018-10-29 NOTE — ED NOTES
Bedside shift change report given to Rich (oncoming nurse) byBee (offgoing nurse). Report included the following information SBAR, Kardex, ED Summary, MAR and Cardiac Rhythm Sinus tach. Pt is alert and oriented x 4 but slow to follow commands Pt denies pain. Mom at bedside. Pt remembered that earlier he was told to give a urine specimen but he walked past the restroom and then came back without a specimen.

## 2018-10-30 ENCOUNTER — APPOINTMENT (OUTPATIENT)
Dept: MRI IMAGING | Age: 38
DRG: 641 | End: 2018-10-30
Attending: INTERNAL MEDICINE
Payer: SELF-PAY

## 2018-10-30 LAB
AMMONIA PLAS-SCNC: 13 UMOL/L
ANION GAP SERPL CALC-SCNC: 10 MMOL/L (ref 5–15)
BUN SERPL-MCNC: 10 MG/DL (ref 6–20)
BUN/CREAT SERPL: 15 (ref 12–20)
CALCIUM SERPL-MCNC: 9.7 MG/DL (ref 8.5–10.1)
CHLORIDE SERPL-SCNC: 104 MMOL/L (ref 97–108)
CHOLEST SERPL-MCNC: 87 MG/DL
CO2 SERPL-SCNC: 22 MMOL/L (ref 21–32)
CREAT SERPL-MCNC: 0.65 MG/DL (ref 0.7–1.3)
ERYTHROCYTE [DISTWIDTH] IN BLOOD BY AUTOMATED COUNT: 12.9 % (ref 11.5–14.5)
EST. AVERAGE GLUCOSE BLD GHB EST-MCNC: 103 MG/DL
GLUCOSE BLD STRIP.AUTO-MCNC: 80 MG/DL (ref 65–100)
GLUCOSE SERPL-MCNC: 82 MG/DL (ref 65–100)
HBA1C MFR BLD: 5.2 % (ref 4.2–6.3)
HCT VFR BLD AUTO: 26.6 % (ref 36.6–50.3)
HDLC SERPL-MCNC: 13 MG/DL
HDLC SERPL: 6.7 {RATIO} (ref 0–5)
HGB BLD-MCNC: 8.4 G/DL (ref 12.1–17)
LDLC SERPL CALC-MCNC: 48 MG/DL (ref 0–100)
LIPID PROFILE,FLP: ABNORMAL
MCH RBC QN AUTO: 26 PG (ref 26–34)
MCHC RBC AUTO-ENTMCNC: 31.6 G/DL (ref 30–36.5)
MCV RBC AUTO: 82.4 FL (ref 80–99)
NRBC # BLD: 0 K/UL (ref 0–0.01)
NRBC BLD-RTO: 0 PER 100 WBC
PLATELET # BLD AUTO: 461 K/UL (ref 150–400)
PMV BLD AUTO: 9.4 FL (ref 8.9–12.9)
POTASSIUM SERPL-SCNC: 3.7 MMOL/L (ref 3.5–5.1)
RBC # BLD AUTO: 3.23 M/UL (ref 4.1–5.7)
SERVICE CMNT-IMP: NORMAL
SODIUM SERPL-SCNC: 136 MMOL/L (ref 136–145)
T4 FREE SERPL-MCNC: 1.5 NG/DL (ref 0.8–1.5)
TRIGL SERPL-MCNC: 130 MG/DL (ref ?–150)
TSH SERPL DL<=0.05 MIU/L-ACNC: 0.06 UIU/ML (ref 0.36–3.74)
TSH SERPL DL<=0.05 MIU/L-ACNC: 0.09 UIU/ML (ref 0.36–3.74)
VIT B12 SERPL-MCNC: 743 PG/ML (ref 193–986)
VLDLC SERPL CALC-MCNC: 26 MG/DL
WBC # BLD AUTO: 9.2 K/UL (ref 4.1–11.1)

## 2018-10-30 PROCEDURE — 97530 THERAPEUTIC ACTIVITIES: CPT

## 2018-10-30 PROCEDURE — 70548 MR ANGIOGRAPHY NECK W/DYE: CPT

## 2018-10-30 PROCEDURE — 74011250636 HC RX REV CODE- 250/636: Performed by: EMERGENCY MEDICINE

## 2018-10-30 PROCEDURE — 82607 VITAMIN B-12: CPT | Performed by: INTERNAL MEDICINE

## 2018-10-30 PROCEDURE — 74011250636 HC RX REV CODE- 250/636: Performed by: INTERNAL MEDICINE

## 2018-10-30 PROCEDURE — 65660000000 HC RM CCU STEPDOWN

## 2018-10-30 PROCEDURE — 70553 MRI BRAIN STEM W/O & W/DYE: CPT

## 2018-10-30 PROCEDURE — 84480 ASSAY TRIIODOTHYRONINE (T3): CPT | Performed by: INTERNAL MEDICINE

## 2018-10-30 PROCEDURE — 85027 COMPLETE CBC AUTOMATED: CPT | Performed by: INTERNAL MEDICINE

## 2018-10-30 PROCEDURE — 97167 OT EVAL HIGH COMPLEX 60 MIN: CPT | Performed by: OCCUPATIONAL THERAPIST

## 2018-10-30 PROCEDURE — 84443 ASSAY THYROID STIM HORMONE: CPT | Performed by: INTERNAL MEDICINE

## 2018-10-30 PROCEDURE — 97535 SELF CARE MNGMENT TRAINING: CPT | Performed by: OCCUPATIONAL THERAPIST

## 2018-10-30 PROCEDURE — 97161 PT EVAL LOW COMPLEX 20 MIN: CPT

## 2018-10-30 PROCEDURE — G8988 SELF CARE GOAL STATUS: HCPCS | Performed by: OCCUPATIONAL THERAPIST

## 2018-10-30 PROCEDURE — 74011000258 HC RX REV CODE- 258: Performed by: EMERGENCY MEDICINE

## 2018-10-30 PROCEDURE — 70544 MR ANGIOGRAPHY HEAD W/O DYE: CPT

## 2018-10-30 PROCEDURE — 83036 HEMOGLOBIN GLYCOSYLATED A1C: CPT | Performed by: INTERNAL MEDICINE

## 2018-10-30 PROCEDURE — 80061 LIPID PANEL: CPT | Performed by: INTERNAL MEDICINE

## 2018-10-30 PROCEDURE — 74011000250 HC RX REV CODE- 250: Performed by: INTERNAL MEDICINE

## 2018-10-30 PROCEDURE — 74011250637 HC RX REV CODE- 250/637: Performed by: INTERNAL MEDICINE

## 2018-10-30 PROCEDURE — 82140 ASSAY OF AMMONIA: CPT | Performed by: INTERNAL MEDICINE

## 2018-10-30 PROCEDURE — 36415 COLL VENOUS BLD VENIPUNCTURE: CPT | Performed by: EMERGENCY MEDICINE

## 2018-10-30 PROCEDURE — 84439 ASSAY OF FREE THYROXINE: CPT | Performed by: INTERNAL MEDICINE

## 2018-10-30 PROCEDURE — 80048 BASIC METABOLIC PNL TOTAL CA: CPT | Performed by: EMERGENCY MEDICINE

## 2018-10-30 PROCEDURE — 84443 ASSAY THYROID STIM HORMONE: CPT | Performed by: EMERGENCY MEDICINE

## 2018-10-30 PROCEDURE — 82962 GLUCOSE BLOOD TEST: CPT

## 2018-10-30 PROCEDURE — 97116 GAIT TRAINING THERAPY: CPT

## 2018-10-30 PROCEDURE — A9575 INJ GADOTERATE MEGLUMI 0.1ML: HCPCS | Performed by: INTERNAL MEDICINE

## 2018-10-30 PROCEDURE — G8987 SELF CARE CURRENT STATUS: HCPCS | Performed by: OCCUPATIONAL THERAPIST

## 2018-10-30 RX ORDER — GADOTERATE MEGLUMINE 376.9 MG/ML
17 INJECTION INTRAVENOUS
Status: COMPLETED | OUTPATIENT
Start: 2018-10-30 | End: 2018-10-30

## 2018-10-30 RX ADMIN — CEFEPIME HYDROCHLORIDE 2 G: 2 INJECTION, POWDER, FOR SOLUTION INTRAVENOUS at 12:43

## 2018-10-30 RX ADMIN — Medication 10 ML: at 20:11

## 2018-10-30 RX ADMIN — ENOXAPARIN SODIUM 40 MG: 40 INJECTION, SOLUTION INTRAVENOUS; SUBCUTANEOUS at 09:37

## 2018-10-30 RX ADMIN — ASPIRIN 81 MG CHEWABLE TABLET 81 MG: 81 TABLET CHEWABLE at 09:37

## 2018-10-30 RX ADMIN — Medication 10 ML: at 13:47

## 2018-10-30 RX ADMIN — LEVOFLOXACIN 750 MG: 5 INJECTION, SOLUTION INTRAVENOUS at 04:30

## 2018-10-30 RX ADMIN — SODIUM CHLORIDE 125 ML/HR: 900 INJECTION, SOLUTION INTRAVENOUS at 03:33

## 2018-10-30 RX ADMIN — CEFEPIME HYDROCHLORIDE 2 G: 2 INJECTION, POWDER, FOR SOLUTION INTRAVENOUS at 20:08

## 2018-10-30 RX ADMIN — SODIUM CHLORIDE 125 ML/HR: 900 INJECTION, SOLUTION INTRAVENOUS at 20:08

## 2018-10-30 RX ADMIN — POLYETHYLENE GLYCOL 3350 17 G: 17 POWDER, FOR SOLUTION ORAL at 09:37

## 2018-10-30 RX ADMIN — GADOTERATE MEGLUMINE 17 ML: 376.9 INJECTION INTRAVENOUS at 09:00

## 2018-10-30 RX ADMIN — CEFEPIME HYDROCHLORIDE 2 G: 2 INJECTION, POWDER, FOR SOLUTION INTRAVENOUS at 03:32

## 2018-10-30 NOTE — PROGRESS NOTES
Pharmacy Automatic Renal Dosing Protocol - Antimicrobials Indication for Antimicrobials: Sepsis Current Regimen of Each Antimicrobial: 
Cefepime 2gm IV q8h - started 10/29 days 2 Levofloxacin 750mg IV q24h - started 10/29 day 2 Previous Antimicrobial Therapy: 
None Significant Cultures:  
10/29 influenza - negative 10/29 PBCx - Ng - Prelim Radiology / Imaging results: (X-ray, CT scan or MRI):  
10/29 CT head -  unremarkable 10/29 CXR- lungs clear Paralysis, amputations, malnutrition: none Labs: 
Recent Labs 10/30/18 
0253 10/28/18 
2201 CREA 0.65* 0.93 BUN 10  WBC 9.2 14.3* Temp (24hrs), Av.6 °F (37 °C), Min:97.8 °F (36.6 °C), Max:99.4 °F (37.4 °C) Creatinine Clearance (mL/min) or Dialysis:  
Estimated Creatinine Clearance: 150.5 mL/min (A) (based on SCr of 0.65 mg/dL (L)). Estimated Creatinine Clearance (using IBW):150.5 mL/min Impression/Plan:  
· Not febrile neutropenia, empirically treating based on signs of infection. · Continue Cefepime and Levofloxacin for now, can hopefully de-escalate soon. · Antimicrobial stop date - pending Pharmacy will follow daily and adjust medications as appropriate for renal function and/or serum levels. Thank you, Jose F Muniz PHARMD 
 
Recommended duration of therapy 
http://[facility]/Pharmacy/Clinical%20Companion/Duration%20of%20ABX%20therapy. docx Renal Dosing 
http://[facility]/[place]/[facility]/Pharmacy/Clinical%20Companion/Renal%[id number]. pdf

## 2018-10-30 NOTE — PROGRESS NOTES
Hematology Oncology Progress Note Follow up for: Met melanoma Chart notes reviewed since last visit. Case discussed with following: 
Patient complains of the following: No complaints Additional concerns noted by the staff:  
 
Patient Vitals for the past 24 hrs: 
 BP Temp Pulse Resp SpO2  
10/30/18 0241 98/59 99.2 °F (37.3 °C) 96 18 97 % 10/29/18 2305 92/58 97.8 °F (36.6 °C) 96 18 97 % 10/29/18 1955 98/60      
10/29/18 1938 (!) 83/54 99.4 °F (37.4 °C) 97 18 97 % 10/29/18 1506 98/59 97.8 °F (36.6 °C) 93 16 96 % 10/29/18 1331 93/58 99.2 °F (37.3 °C) 98 16 97 % Review of Systems: 
12 point ROS done and negative except as above Physical Examination: 
Gen NAD 
CV reg Lungs clear 
abd benign Labs: 
Recent Results (from the past 24 hour(s)) DRUG SCREEN, URINE Collection Time: 10/29/18  1:00 PM  
Result Value Ref Range AMPHETAMINES NEGATIVE  NEG    
 BARBITURATES NEGATIVE  NEG BENZODIAZEPINES NEGATIVE  NEG    
 COCAINE NEGATIVE  NEG METHADONE NEGATIVE  NEG    
 OPIATES POSITIVE (A) NEG    
 PCP(PHENCYCLIDINE) NEGATIVE  NEG    
 THC (TH-CANNABINOL) POSITIVE (A) NEG Drug screen comment (NOTE) METABOLIC PANEL, BASIC Collection Time: 10/30/18  2:53 AM  
Result Value Ref Range Sodium 136 136 - 145 mmol/L Potassium 3.7 3.5 - 5.1 mmol/L Chloride 104 97 - 108 mmol/L  
 CO2 22 21 - 32 mmol/L Anion gap 10 5 - 15 mmol/L Glucose 82 65 - 100 mg/dL BUN 10 6 - 20 MG/DL Creatinine 0.65 (L) 0.70 - 1.30 MG/DL  
 BUN/Creatinine ratio 15 12 - 20 GFR est AA >60 >60 ml/min/1.73m2 GFR est non-AA >60 >60 ml/min/1.73m2 Calcium 9.7 8.5 - 10.1 MG/DL  
LIPID PANEL Collection Time: 10/30/18  2:53 AM  
Result Value Ref Range LIPID PROFILE Cholesterol, total 87 <200 MG/DL Triglyceride 130 <150 MG/DL  
 HDL Cholesterol 13 MG/DL  
 LDL, calculated 48 0 - 100 MG/DL VLDL, calculated 26 MG/DL  
 CHOL/HDL Ratio 6.7 (H) 0 - 5.0 HEMOGLOBIN A1C WITH EAG Collection Time: 10/30/18  2:53 AM  
Result Value Ref Range Hemoglobin A1c 5.2 4.2 - 6.3 % Est. average glucose 103 mg/dL CBC W/O DIFF Collection Time: 10/30/18  2:53 AM  
Result Value Ref Range WBC 9.2 4.1 - 11.1 K/uL  
 RBC 3.23 (L) 4.10 - 5.70 M/uL HGB 8.4 (L) 12.1 - 17.0 g/dL HCT 26.6 (L) 36.6 - 50.3 % MCV 82.4 80.0 - 99.0 FL  
 MCH 26.0 26.0 - 34.0 PG  
 MCHC 31.6 30.0 - 36.5 g/dL  
 RDW 12.9 11.5 - 14.5 % PLATELET 365 (H) 327 - 400 K/uL MPV 9.4 8.9 - 12.9 FL  
 NRBC 0.0 0  WBC ABSOLUTE NRBC 0.00 0.00 - 0.01 K/uL  
TSH 3RD GENERATION Collection Time: 10/30/18  2:53 AM  
Result Value Ref Range TSH 0.06 (L) 0.36 - 3.74 uIU/mL MRI brain No evidence of intracranial metastatic disease. 
  
No intracranial mass, hemorrhage or evidence of acute infarction. Normal MRI of the brain. 
  
Maxillary sinus disease. 
  
No aneurysm, dissection, hemodynamically significant stenosis or major vessel occlusion Assessment and Plan:  
Met melanoma 
-admitted with some confusion-better 
-records from [facility] reviewed 
-was on ipi/nivo for 5 cycles 
-PET after 5 cycles showed progression or possible pseudoprogression 
-Patient to f/u with [facility] after repeat scans to determine treatment 
-MRI of brain negative He needs to f/u with his oncologists at [facility] and at [facility]

## 2018-10-30 NOTE — CONSULTS
NEUROLOGY CONSULT    NAME Halle Gallo AGE 41 y.o. MRN [id number]  1980     REQUESTING PHYSICIAN: Narendra Wolff MD      CHIEF COMPLAINT:  dysarthria     This is a 41 y.o. male with a medical history of metastatic melanoma. He is admitted after developing dysarthria and confusion. He has no other deficits other than residual gait difficulties from the excision of lesion on his left leg. Also complains of persistent fatigue no weight loss. ASSESSMENT AND PLAN     1. Dysarthria  MRI of the brain with contrast  If MRI and EEG clear. Then treat for laryngitis. May discharge with oncology follow up    2. Laryngitis  On levoquin    3. Metastatic melanoma  Appreciate oncology input    4. Altered mental status  EEG    5. Fatigue  May be related to therapy or melanoma. ALLERGIES:  Patient has no known allergies.      Current Facility-Administered Medications   Medication Dose Route Frequency    cefepime (MAXIPIME) 2 g in 0.9% sodium chloride (MBP/ADV) 100 mL  2 g IntraVENous Q8H    levoFLOXacin (LEVAQUIN) 750 mg in D5W IVPB  750 mg IntraVENous Q24H    0.9% sodium chloride infusion  125 mL/hr IntraVENous CONTINUOUS    sodium chloride (NS) flush 5-10 mL  5-10 mL IntraVENous Q8H    sodium chloride (NS) flush 5-10 mL  5-10 mL IntraVENous PRN    acetaminophen (TYLENOL) tablet 650 mg  650 mg Oral Q4H PRN    Or    acetaminophen (TYLENOL) solution 650 mg  650 mg Per NG tube Q4H PRN    Or    acetaminophen (TYLENOL) suppository 650 mg  650 mg Rectal Q4H PRN    aspirin chewable tablet 81 mg  81 mg Oral DAILY    magnesium hydroxide (MILK OF MAGNESIA) 400 mg/5 mL oral suspension 30 mL  30 mL Oral DAILY PRN    enoxaparin (LOVENOX) injection 40 mg  40 mg SubCUTAneous Q24H    ondansetron (ZOFRAN) injection 4 mg  4 mg IntraVENous Q4H PRN    polyethylene glycol (MIRALAX) packet 17 g  17 g Oral DAILY       Past Medical History:   Diagnosis Date    Cancer [facility])        Social History     Tobacco Use    Smoking status: Never Smoker    Smokeless tobacco: Never Used   Substance Use Topics    Alcohol use: No     Frequency: Never     Family History  No seizures  No huntingtons     Reviee of systems  12 systems reviewed and all were negative unless otherwise stated in the HPI        Visit Vitals  BP 98/59 (BP 1 Location: Right arm, BP Patient Position: At rest)   Pulse 96   Temp 99.2 °F (37.3 °C)   Resp 18   Ht 5' 8\" (1.727 m)   Wt 154 lb 12.2 oz (70.2 kg)   SpO2 97%   BMI 23.53 kg/m²      General: Well developed, well nourished. Patient in no apparent distress   Head: Normocephalic, atraumatic, anicteric sclera  Film on tongue and inflamed lingula   Neck Normal ROM, No thyromegally   Lungs:  Clear to auscultation bilaterally, No wheezes or rubs   Cardiac: Regular rate and rhythm with no murmurs. Abd: Bowel sounds were audible. No tenderness on palpation   Ext: No pedal edema   Skin: Supple no rash. Healed surgical scar lateral left knee and left groin     NeurologicExam:  Mental Status: Alert and oriented to person place and time   Speech: Fluent dysarthric   Cranial Nerves:  II - XII Intact   Motor:  Full and symmetric strength of upper and lower proximal and distal muscles. Normal bulk and tone. Reflexes:   Deep tendon reflexes 2+/4 and symmetric. Sensory:   Symmetric and intact with no perceived deficits modalities involving small or large fibers. Gait:  Gait deferred. Tremor:   No tremor noted. Cerebellar:  Coordination intact. Neurovascular: No carotid bruits. No JVD       REVIEWED IMAGING:    MRI :  No results found for this or any previous visit. CT:  Results from Hospital Encounter encounter on 10/28/18   CT HEAD WO CONT    Narrative EXAM:  CT HEAD WO CONT    INDICATION:   Confusion/delirium, altered LOC, unexplained, lethargy and  confusion. Symptoms for 2 weeks. COMPARISON: None. CONTRAST:  None. TECHNIQUE: Unenhanced CT of the head was performed using 5 mm images.  Brain and  bone windows were generated. CT dose reduction was achieved through use of a  standardized protocol tailored for this examination and automatic exposure  control for dose modulation. FINDINGS:  The ventricles and sulci are normal in size, shape and configuration and  midline. There is no significant white matter disease. There is no intracranial  hemorrhage, extra-axial collection, mass, mass effect or midline shift. The  basilar cisterns are open. No acute infarct is identified. The bone windows  demonstrate no abnormalities. The visualized portions of the paranasal sinuses  and mastoid air cells are clear. Impression IMPRESSION: Unremarkable CT of the head.            REVIEWED LABS:  Lab Results   Component Value Date/Time    WBC 9.2 10/30/2018 02:53 AM    HCT 26.6 (L) 10/30/2018 02:53 AM    HGB 8.4 (L) 10/30/2018 02:53 AM    PLATELET 573 (H) 42/56/9466 02:53 AM     Lab Results   Component Value Date/Time    Sodium 136 10/30/2018 02:53 AM    Potassium 3.7 10/30/2018 02:53 AM    Chloride 104 10/30/2018 02:53 AM    CO2 22 10/30/2018 02:53 AM    Glucose 82 10/30/2018 02:53 AM    BUN 10 10/30/2018 02:53 AM    Creatinine 0.65 (L) 10/30/2018 02:53 AM    Calcium 9.7 10/30/2018 02:53 AM       Lab Results   Component Value Date/Time    LDL, calculated 48 10/30/2018 02:53 AM     Lab Results   Component Value Date/Time    Hemoglobin A1c 5.2 10/30/2018 02:53 AM

## 2018-10-30 NOTE — CDMP QUERY
Account Number: [de-identified] MRN: [id number] Patient: Merline Passer Created: [id number] Clinician Name: Keren Tripathi : 
Please clarify if this patient is (was) being treated/managed for:  
 
=> sirs d/t non-infectious process as evidenced by wbc 14--9, immunosuppression, temp 100.2,   83/54  hr 97--107,   18--26, lactic 2.4 req Cefepime, Lvq, NS2L 
=Sepsis ruled out =Sepsis ruled in 
=> Other explanation of clinical findings 
=> Clinically Undetermined (no explanation for clinical findings) The medical record reflects the following clinical findings, treatment, and risk factors. Risk Factors:  38m adm w/ laryngitis, ams, metastatic melanoma Clinical Indicators: ED-- treat as septic until proven otherwise based on presumed immunosuppression, wbc 14--9, immunosuppression, temp 100.2,   83/54  hr 97--107,   18--26, lactic 2.4 , bc neg Treatment: Cefepime, Lvq, NS2L Please clarify and document your clinical opinion in the progress notes and discharge summary including the definitive and/or presumptive diagnosis, (suspected or probable), related to the above clinical findings. Please include clinical findings supporting your diagnosis. Thank Jose Enrique Florence Rn/CDMP

## 2018-10-30 NOTE — PROGRESS NOTES
Hospitalist Progress Note NAME: Margarito Mcgraw :  1980 MRN:  [id number] Assessment / Plan: SIRS with leukocytosis, tachycardia and hypotension and fevers at home POA with lactic acidosis POA Acute Encephalopathy POA Suspect due to dehydration in settings of chemotherapy for metastatic melanoma R/o infectious etiology due to immunocompromised status UA and CXR negative Continue Cefepime and Levaquin for now F/u blood cultures If cultures remain negative then complete Px coarse of Levaquin ? Laryngitis MRI brain without acute CVA or mets Neurology and oncology on the case EEG pending Check TSH, Free T4 and Total T3 in light of abnormal TSH which could be sick thyroid PT/OT/Speech UDS positive for opiates and THC 
R/o groin abscess, will ask surgery consult Rt groin pain s/p surgery US with Hematoma or complex seroma adjacent to the common femoral vein  
Will ask surgery input 
  
Hyponatremia Improving with IVF Continue to monitor 
  
Code Status: Full code Surrogate Decision Maker:mother [id number] 
[phone] 
  
DVT Prophylaxis: lovenox GI Prophylaxis: not indicated 
  
Baseline: ambulatory Subjective: Pt seen and examined at bedside. NAD. Fair insight. Overnight events d/w RN  
CHIEF COMPLAINT: f/u \"Lethargy\"  
  
Review of Systems: 
Symptom Y/N Comments  Symptom Y/N Comments Fever/Chills n   Chest Pain n   
Poor Appetite    Edema Cough n   Abdominal Pain n   
Sputum    Joint Pain SOB/HOFFMAN n   Pruritis/Rash Nausea/vomit    Tolerating PT/OT Diarrhea    Tolerating Diet y Constipation    Other Could NOT obtain due to:   
 
Objective: VITALS:  
Last 24hrs VS reviewed since prior progress note. Most recent are: 
Patient Vitals for the past 24 hrs: 
 Temp Pulse Resp BP SpO2  
10/30/18 1534 98.4 °F (36.9 °C) 98 16 98/62 96 % 10/30/18 1525 98.4 °F (36.9 °C) (!) 104 18 95/57 95 % 10/30/18 1251 97.8 °F (36.6 °C) 84 18 109/73 99 % 10/30/18 0241 99.2 °F (37.3 °C) 96 18 98/59 97 % 10/29/18 2305 97.8 °F (36.6 °C) 96 18 92/58 97 % 10/29/18 1955    98/60   
10/29/18 1938 99.4 °F (37.4 °C) 97 18 (!) 83/54 97 % Intake/Output Summary (Last 24 hours) at 10/30/2018 1914 Last data filed at 10/30/2018 0630 Gross per 24 hour Intake 320 ml Output 950 ml Net -630 ml PHYSICAL EXAM: 
General: WD, WN. Alert, cooperative, no acute distress   
EENT:  EOMI. Anicteric sclerae. MMM Resp:  CTA bilaterally, no wheezing or rales. No accessory muscle use CV:  Regular  rhythm,  No edema GI:  Soft, Non distended, Non tender.  +Bowel sounds Neurologic:  Alert and oriented X 3, normal speech, Psych:   Good insight. Not anxious nor agitated Skin:  R groin swelling, No rashes. No jaundice Reviewed most current lab test results and cultures  YES Reviewed most current radiology test results   YES Review and summation of old records today    NO Reviewed patient's current orders and MAR    YES 
PMH/SH reviewed - no change compared to H&P 
________________________________________________________________________ Care Plan discussed with: 
  Comments Patient y Family RN y   
Care Manager Consultant Multidiciplinary team rounds were held today with , nursing, pharmacist and clinical coordinator. Patient's plan of care was discussed; medications were reviewed and discharge planning was addressed. ________________________________________________________________________ Total NON critical care TIME:  35  Minutes Total CRITICAL CARE TIME Spent:   Minutes non procedure based Comments >50% of visit spent in counseling and coordination of care    
________________________________________________________________________ Rice MD Michael  
 
Procedures: see electronic medical records for all procedures/Xrays and details which were not copied into this note but were reviewed prior to creation of Plan. LABS: 
I reviewed today's most current labs and imaging studies. Pertinent labs include: 
Recent Labs 10/30/18 
0253 10/28/18 
2201 WBC 9.2 14.3* HGB 8.4* 11.6* HCT 26.6* 37.0 * 633* Recent Labs 10/30/18 
0021 10/29/18 
0502 10/28/18 
2201   --  131*  
K 3.7  --  4.2   --  96* CO2 22  --  21  
GLU 82  --  83 BUN 10  --  18  
CREA 0.65*  --  0.93  
CA 9.7  --  12.2* ALB  --   --  3.2* TBILI  --   --  0.7 SGOT  --   --  32 ALT  --   --  19 INR  --  1.5*  --   
 
 
Signed: Sondra Velasco MD

## 2018-10-30 NOTE — PROCEDURES
Thomas Jeffrey, [address]      Electroencephalogram         Procedure Date: 10/30/18    Procedure ID: [id number]    Procedure Type: Routine    Patient Name: Guillermo Zimmer     YOB: 1980    Medical Record No: [id number]    INDICATION: Altered mental status    Medications:    Current Facility-Administered Medications:     cefepime (MAXIPIME) 2 g in 0.9% sodium chloride (MBP/ADV) 100 mL, 2 g, IntraVENous, Q8H, Sharon López MD, Last Rate: 200 mL/hr at 10/30/18 1243, 2 g at 10/30/18 1243    levoFLOXacin (LEVAQUIN) 750 mg in D5W IVPB, 750 mg, IntraVENous, Q24H, Sharon López MD, Last Rate: 100 mL/hr at 10/30/18 0430, 750 mg at 10/30/18 0430    0.9% sodium chloride infusion, 125 mL/hr, IntraVENous, CONTINUOUS, Michael Meadows MD, Last Rate: 125 mL/hr at 10/30/18 0333, 125 mL/hr at 10/30/18 0333    sodium chloride (NS) flush 5-10 mL, 5-10 mL, IntraVENous, Q8H, Saumya ARGUETA MD, 10 mL at 10/30/18 1347    sodium chloride (NS) flush 5-10 mL, 5-10 mL, IntraVENous, PRN, Michael Meadows MD    acetaminophen (TYLENOL) tablet 650 mg, 650 mg, Oral, Q4H PRN **OR** acetaminophen (TYLENOL) solution 650 mg, 650 mg, Per NG tube, Q4H PRN **OR** acetaminophen (TYLENOL) suppository 650 mg, 650 mg, Rectal, Q4H PRN, Michael Meadows MD    aspirin chewable tablet 81 mg, 81 mg, Oral, DAILY, Michael Meadows MD, 81 mg at 10/30/18 9706    magnesium hydroxide (MILK OF MAGNESIA) 400 mg/5 mL oral suspension 30 mL, 30 mL, Oral, DAILY PRN, Michael Meadows MD    enoxaparin (LOVENOX) injection 40 mg, 40 mg, SubCUTAneous, Q24H, Saumya ARGUETA MD, 40 mg at 10/30/18 0937    ondansetron (ZOFRAN) injection 4 mg, 4 mg, IntraVENous, Q4H PRN, Saumya ARGUETA MD, 4 mg at 10/29/18 1025    polyethylene glycol (MIRALAX) packet 17 g, 17 g, Oral, DAILY, Michael Meadows MD, 17 g at 10/30/18 4150    DESCRIPTION OF PROCEDURE: Electrodes were applied in accordance with the international 10-20 system of electrode placement. EEG was reviewed in both bipolar and referential montages    DESCRIPTION OF FINDINGS: Background consists of symmetric  theta activity. This activity waxes and wanes and appears dysynchronous at times. With photic stimulation a symmetric occipital driving response is noted. Frequent tiphasic waves are seen. No seizures are noted    IMPRESSION:  Abnormal EEG indicative of a nonspecific metabolic or toxic encephalopathic process. Absence of seizures on this study does not exclude epilepsy. Clinical correlation is advised.

## 2018-10-30 NOTE — PROGRESS NOTES
Problem: Self Care Deficits Care Plan (Adult) Goal: *Acute Goals and Plan of Care (Insert Text) Occupational Therapy Goals Initiated 10/30/2018 1. Patient will perform standing grooming independently, including set up, with independence within 7 day(s). 2.  Patient will utilize at least one strategy for improving memory during IADLs  with independence within 7 day(s). 3.  Patient will perform adl/self care routine demonstrating good planning and attention to task for at least 10 minutes with minimal verbal cues within 7 day(s). 4.  Patient will perform toilet transfers with independence within 7 day(s). 5.  Patient will perform all aspects of toileting with independence within 7 day(s). 6.  Patient will participate in upper extremity therapeutic exercise/activities with supervision/set-up for 10 minutes within 7 day(s). Occupational Therapy EVALUATION Patient: Vianney Valentin (80 y.o. male) Date: 10/30/2018 Primary Diagnosis: SIRS (systemic inflammatory response syndrome) ([facility]) Precautions: fall ASSESSMENT : 
Based on the objective data described below, the patient presents with history of metastatic melanoma,  confusion, impaired memory and orientation, decreased insight into deficits, mildly decreased balance, generalized weakness, and decreased endurance impairing independence and safety during adls and functional mobility. Pt is generally functioning at supervision level for self care and mobility, however  Significant Cognitive deficits impair safety at this time. Pt's mother was present during evaluation and reports that she will take pt home to live with her, due to pt's confusion and safety concerns.   
 Pt participated in the [facility] Cognitive Assessment) which is a cognitive screening test.  Pt took additional time to complete the test and scored significantly low-8/30 which indicates severe cognitive impairment. Pt will need supervision/assistance at home for adls/IADLs.  (Mother is aware and feels pt cannot live alone at this time.) Pt will benefit from further, more comprehensive cognitive testing and cognitive therapy as appropriate. Recommend Outpatient Speech - Language consult as well as outpatient Occupational Therapy to address cognitive issues and increase safety during adls and iADLs. . 
Will initiate acute OT treatment while hospitalized, however, mother anticipates that pt will be discharged, likely tomorrow. Patient will benefit from skilled intervention to address the above impairments. Patients rehabilitation potential is considered to be Good Factors which may influence rehabilitation potential include:  
[]             None noted [x]             Mental ability/status [x]             Medical condition []             Home/family situation and support systems [x]             Safety awareness []             Pain tolerance/management 
[]             Other: PLAN : 
Recommendations and Planned Interventions: 
[x]               Self Care Training                  [x]        Therapeutic Activities [x]               Functional Mobility Training    [x]        Cognitive Retraining 
[x]               Therapeutic Exercises           [x]        Endurance Activities [x]               Balance Training                   [x]        Neuromuscular Re-Education []               Visual/Perceptual Training     [x]   Home Safety Training 
[x]               Patient Education                 []        Family Training/Education []               Other (comment): Frequency/Duration: Patient will be followed by occupational therapy 5 times a week to address goals. Discharge Recommendations: Outpatient SLP and Outpatient OT Further Equipment Recommendations for Discharge: tbd SUBJECTIVE:  
Patient stated I'm weak.  OBJECTIVE DATA SUMMARY:  
HISTORY:  
Past Medical History: Diagnosis Date  Cancer ([facility] [address] History reviewed. No pertinent surgical history. Prior Level of Function/Environment/Context: Pt reports that he was independent in adls and IADLs and working at [employer] on night shift prior to his CA treatments that started in mid summer. Occupations in which the patient is/was successful, what are the barriers preventing that success: medical condition /cognitive impairment/confusion Performance Patterns (routines, roles, habits, and rituals):  
Personal Interests and/or values:  
Expanded or extensive additional review of patient history: metastatic melanoma: dx-treatment mid summer, followed at [facility], new MRI shows no evidence of metastatic disease. = for opiates and THC on admission, HGB 10/28: 11.6 ; HGB 10/30: 8.4 - see doc flow Home Situation Home Environment: Private residence # Steps to Enter: 3 Rails to Enter: Yes Hand Rails : Left One/Two Story Residence: One story Living Alone: Yes Support Systems: Friends \ neighbors Patient Expects to be Discharged to[de-identified] Private residence Current DME Used/Available at Home: None Tub or Shower Type: Shower Hand dominance: LeftEXAMINATION OF PERFORMANCE DEFICITS: 
Cognitive/Behavioral Status: 
Neurologic State: Alert Orientation Level: Oriented X4(needing increased time to say birthdate) Cognition: Impulsive; Follows commands;Decreased command following Perception: Appears intact Safety/Judgement: Decreased awareness of need for assistance;Decreased awareness of need for safety;Decreased insight into deficits; Fall prevention Skin: generally intact, did not observed surgical site LE Edema: none observed Hearing: Auditory Auditory Impairment: None Vision/Perceptual:   
Tracking: (scans environment) Acuity: Impaired near vision; Impaired far vision Corrective Lenses: Glasses Range of Motion: Marcina Blood AROM: Generally decreased, functional 
  
  
  
  
  
  
  
Strength: BUEs:   
 Strength: Generally decreased, functional 
  
  
  
  
Coordination: 
  
Fine Motor Skills-Upper: Left Intact; Right Intact Gross Motor Skills-Upper: Left Impaired;Right Impaired Tone & Sensation: 
Sensation: intact Tone: Normal 
  
  
  
  
  
  
  
Balance: 
Sitting: Intact Standing: Impaired Standing - Static: Good Standing - Dynamic : Fair(occasional) Functional Mobility and Transfers for ADLs:Bed Mobility: 
Rolling: Supervision Supine to Sit: Supervision Scooting: Supervision Transfers: 
Sit to Stand: Supervision Stand to Sit: Supervision Bed to Chair: Supervision Bathroom Mobility: Supervision/set up Toilet Transfer : Supervision(used grab bar) ADL Assessment: 
Feeding: Independent Oral Facial Hygiene/Grooming: Stand-by assistance Bathing: Stand-by assistance Upper Body Dressing: Setup Lower Body Dressing: Supervision Toileting: Independent ADL Intervention and task modifications: Pt performed ambulation to the bathroom for toilet transfer and standing grooming requiring S for safety. Pt able to  an object from the floor with CGA for safety. No complaints of dizziness. Pt performed ambulation in room with S/CGA to move an object away from the closet door to reach in and place objects from upper shelf to low drawer of the night stand. Mother reporting her concern for pt's confusion and her need for documentation for FMLA from her place of work. Discussed with . Pt participated in [facility] testing requiring additional time to complete each segment of the screening test.   
  
 
  
 
  
 
  
 
  
 
  
 
Cognitive Retraining Safety/Judgement: Decreased awareness of need for assistance;Decreased awareness of need for safety;Decreased insight into deficits; Fall prevention Therapeutic Exercise: 
Encouraged sitting up for all meals and calling nurse for ambulation in room.    
Pt seems to lack initiation for exercises and reports fatigue after minimal activity and wishes to return to bed. Functional Measure: 
Barthel Index: 
 
Bathin Bladder: 10 Bowels: 10 
Groomin Dressing: 10 Feeding: 10 Mobility: 10 Stairs: 10 Toilet Use: 10 Transfer (Bed to Chair and Back): 10 Total: 85 Barthel and G-code impairment scale: 
Percentage of impairment CH 
0% CI 
1-19% CJ 
20-39% CK 
40-59% CL 
60-79% CM 
80-99% CN 
100% Barthel Score 0-100 100 99-80 79-60 59-40 20-39 1-19 
 0 Barthel Score 0-20 20 17-19 13-16 9-12 5-8 1-4 0 The Barthel ADL Index: Guidelines 1. The index should be used as a record of what a patient does, not as a record of what a patient could do. 2. The main aim is to establish degree of independence from any help, physical or verbal, however minor and for whatever reason. 3. The need for supervision renders the patient not independent. 4. A patient's performance should be established using the best available evidence. Asking the patient, friends/relatives and nurses are the usual sources, but direct observation and common sense are also important. However direct testing is not needed. 5. Usually the patient's performance over the preceding 24-48 hours is important, but occasionally longer periods will be relevant. 6. Middle categories imply that the patient supplies over 50 per cent of the effort. 7. Use of aids to be independent is allowed. Pedro Vick, Barthel, D.W. (0686). Functional evaluation: the Barthel Index. [address] [facility] (14)2. DENYS [place], Geetha Moreira., Allan Hahn., [place], [address] [facility] (). Measuring the change indisability after inpatient rehabilitation; comparison of the responsiveness of the Barthel Index and Functional [place] Measure. Journal of Neurology, Neurosurgery, and Psychiatry, 66(4), 183-012.  
Danay Bourne, [place].LAURA, ARMIDA Nguyễn, & Liberty Pacheco MKEATON. (2004.) Assessment of post-stroke quality of life in cost-effectiveness studies: The usefulness of the Barthel Index and the EuroQoL-5D. [facility], 52, 324-67 [place] Cognitive Assessment [facility]): 
 
Patient scored 8/30. The patient scored low in  of all areas tested. Pt received full credit for naming and half credit for orientation. Pt scored poorly in visuospatial/executive section, Memory section, attention section, and language section. Confounding factor(s) include: admission for confusion, sepsis, hx of metastatic melanoma. The score of 8/30 indicates severe cognitive impairment Percentage of impairment CH 
0% CI 
1-19% CJ 
20-39% CK 
40-59% CL 
60-79% CM 
80-99% CN 
100% [place] Score 0-30 30 24-29 18-23 12-17 6-11 1-5 0 The [place] Cognitive Assessment [facility]) is designed to assess cognition in areas of visuospatial, executive, naming, memory, attention, language, abstraction, delayed recall, and orientation. Score of greater than or equal to 26/30 is considered normal cognition. Score of less than 26 indicates mild cognitive impairment. Score of 16 or lower indicates severe cognitive impairment. Torrey Sykes I., Kannan Albarran., YOLANDA Raines. (2005). The [facility] Cognitive Assessment, [place]: A brief screening tool for mild cognitive impairment. Journal of the [place], 80, 413-798. G codes: In compliance with CMSs Claims Based Outcome Reporting, the following G-code set was chosen for this patient based on their primary functional limitation being treated: The outcome measure chosen to determine the severity of the functional limitation was the [facility] TEST  with a score of 8/30 which indicates s evere cognitive impairment and was correlated with the impairment scale. ? Self Care:  
  - CURRENT STATUS: CL - 60%-79% impaired, limited or restricted  - GOAL STATUS: CK - 40%-59% impaired, limited or restricted  - D/C STATUS:  ---------------To be determined--------------- Occupational Therapy Evaluation Charge Determination History Examination Decision-Making MEDIUM Complexity : Expanded review of history including physical, cognitive and psychosocial  history  MEDIUM Complexity : 3-5 performance deficits relating to physical, cognitive , or psychosocial skils that result in activity limitations and / or participation restrictions MEDIUM Complexity : Patient may present with comorbidities that affect occupational performnce. Miniml to moderate modification of tasks or assistance (eg, physical or verbal ) with assesment(s) is necessary to enable patient to complete evaluation Based on the above components, the patient evaluation is determined to be of the following complexity level: MEDIUM Pain: 
Pain Scale 1: Numeric (0 - 10) Pain Intensity 1: 0 Activity Tolerance:  
Pt reports fatigue after minimal activity After treatment:  
[x] Patient left in no apparent distress sitting up in chair 
[] Patient left in no apparent distress in bed 
[x] Call bell left within reach [x] Nursing notified 
[x] Caregiver present [x] Bed alarm activated-chair COMMUNICATION/EDUCATION:  
The patients plan of care was discussed with: Physical Therapist, Speech Therapist, Registered Nurse and . [] Home safety education was provided and the patient/caregiver indicated understanding. [x] Patient/family have participated as able in goal setting and plan of care. [] Patient/family agree to work toward stated goals and plan of care. [] Patient understands intent and goals of therapy, but is neutral about his/her participation. [] Patient is unable to participate in goal setting and plan of care. This patients plan of care is appropriate for delegation to [facility] [place]. Thank you for this referral. 
Yamileth Damon OTR/L Time Calculation: 63 mins

## 2018-10-30 NOTE — PROGRESS NOTES
physical Therapy EVALUATION/DISCHARGE Patient: Melina Villasenor (64 y.o. male) Date: 10/30/2018 Primary Diagnosis: SIRS (systemic inflammatory response syndrome) ([facility]) Precautions: n/a ASSESSMENT : 
Based on the objective data described below, the patient presents with baseline levels of functional mobility and balance. Pt presented supine, pleasant and agreeable to therapy. Pt history and PLOF was recounted as below. Pt's CT and MRI cleared for any acute process, but with maxillary sinus disease. Per pt and mother's report pt close to baseline, with some increased difficulty in word finding and some slurred speech. Pt able to complete bed mobility with S, and sit<>stand transfers throughout session with S for safety. Pt then amb 400 ft initially with CGA then progressing to S. Pt reaching out for hand holds when amb around furniture, but otherwise dynamic balance intact. Pt demonstrating decreased gait speed (0.74 m/s fastest placing him as a limited community ambulator), decreased step length, decreased step clearance, and narrowed CAMPOS during amb, all of which pt reports is at baseline for him. Pt returned to room, able to  tandem stance, with eyes closed, and  SLS for 15 sec, demonstrating intact static balance. Pt administered TUG, with average time of 3 trials at 12.67 sec, placing him at a low risk of falling based on age matched norms. Pt returned to sitting in chair to end session. Throughout session pt needing increased processing time to complete multistep instructions, and when instructions not very clear pt needing them to be repeated and broken down into concrete terms to complete successfully. With increased distractions pt needing increased time and attempts to provide answers to orientation questions. Discussed findings with OT who plans to assess for further cognitive testing.   
 
Skilled physical therapy is not indicated at this time as pt is at his baseline level of functional mobility. At discharge, pt plans to return home with his mother, who should be able to provide adequate supervision as needed. PLAN : 
Discharge Recommendations: No PT needs, defer to SLP/OT for recommendations Further Equipment Recommendations for Discharge: none SUBJECTIVE:  
Patient stated I've just been exhausted when I come home from work.  OBJECTIVE DATA SUMMARY:  
HISTORY:   
Past Medical History:  
Diagnosis Date  Cancer ([facility] [address] History reviewed. No pertinent surgical history. Prior Level of Function/Home Situation: PTA pt independent in amb without use of AD. 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 Pt lives alone, is independent in ADLs, and driving. Pt with recent history of metastatic melanoma originating from LLE. Pt reports beginning immunotherapy beginning in Aug 2018. Pt notes that since the start of this treatment he has increased exhaustion, often feeling so tired when he comes home from work that he doesn't have the energy to make a meal.  
Personal factors and/or comorbidities impacting plan of care: Melanoma Home Situation Home Environment: Private residence # Steps to Enter: 3 Rails to Enter: Yes Hand Rails : Left One/Two Story Residence: One story Living Alone: Yes Support Systems: Friends \ neighbors Patient Expects to be Discharged to[de-identified] Private residence Current DME Used/Available at Home: None Tub or Shower Type: Shower EXAMINATION/PRESENTATION/DECISION MAKING:  
Critical Behavior: 
Neurologic State: Alert Orientation Level: Oriented X4(needing increased time to say birthdate) Cognition: Impulsive, Follows commands, Decreased command following Safety/Judgement: Decreased awareness of environment, Decreased awareness of need for assistance, Decreased awareness of need for safety, Decreased insight into deficits Hearing: Auditory Auditory Impairment: None Range Of Motion: AROM: Generally decreased, functional 
  
  
  
  
  
  
  
Strength:   
Strength: Generally decreased, functional 
  
  
  
  
  
  
Tone & Sensation:  
Tone: Normal 
  
  
  
  
  
  
  
  
   
  
Vision:  
Corrective Lenses: Glasses Functional Mobility: 
Bed Mobility: 
Rolling: Supervision Supine to Sit: Supervision Scooting: Supervision Transfers: 
Sit to Stand: Supervision Stand to Sit: Supervision Balance:  
Sitting: Intact Standing: Impaired Standing - Static: Good Standing - Dynamic : Fair(occasional) Ambulation/Gait Training: 
Distance (ft): 400 Feet (ft) Assistive Device: Gait belt Ambulation - Level of Assistance: Contact guard assistance;Supervision(progressed to supervision as session progressed) Gait Description (WDL): Exceptions to [facility] Gait Abnormalities: Decreased step clearance Base of Support: Narrowed Speed/[place]: Slow(0.74 m/s) Step Length: Left shortened;Right shortened Gait Speed: 
 
Utilizing distance of 6.7 m for test, as well as feet to meters calculation, patient ambulated at 0.74 m/s with self-selected gait speed. Rebeca R. \"Comfortable and maximum walking speed of adults aged 20-79 years: reference values and determinants. \" Age and Agin Volume 26(1):15-9. Will James. \"Age- and gender-related test performance in community-dwelling elderly people: Six-Minute Walk Test, Anthony Balance Scale, Timed Up & Go Test, and gait speeds. \" Physical Therapy: 2002 Volume 82(2):128-37. eSnait MORAN, Peg NEGRO, Demarco SAGASTUMED, [facility]. \"Assessing stability and change of four performance measures: a longitudinal study evaluating outcome following total hip and knee arthroplasty. \" [facility] Musculoskeletal Disorders: 2005 Volume 6(3).  
 
Cintia Carrion, PhD; Alexia Fay, PhD. Violette Carter Paper: \"Walking Speed: the Sixth Vital Sign\" Journal of Geriatric Physical Therapy:  - Volume 32 - Issue 2 - p 25 . Marleni Apodaca DPT; Bishop Peralta PhD; Malissa Thomas PT PHD. Walking Speed: The Functional Vital Sign. Journal of Aging Phys Act 2015 April; 23(2):314-322. Functional Measure: 
Timed up and go: 
 
Timed Get Up And Go Test: 12.67(average 3 tests) Timed Up and Go and G-code impairment scale: 
Percentage of Impairment CH 
 
0% 
 CI 
 
1-19% CJ 
 
20-39% CK 
 
40-59% CL 
 
60-79% CM 
 
80-99% CN  
 
100% Timed Score 0-56 10 11-12 13-14 15-16 17-18 19 20  
 
 
< than 10 seconds=Normal  Greater then 13.5 seconds (in elderly)=Increased fall risk Yasemin RAUSCH. Predicting the probability for falls in community dwelling older adults using the Timed Up and Go Test. Phys Ther. 2000;80:896-903. G codes: In compliance with CMSs Claims Based Outcome Reporting, the following G-code set was chosen for this patient based on their primary functional limitation being treated: The outcome measure chosen to determine the severity of the functional limitation was the TUG with a score of 12.67 sec average which was correlated with the impairment scale. ? Mobility - Walking and Moving Around:  
  - CURRENT STATUS: CJ - 20%-39% impaired, limited or restricted  - GOAL STATUS: CJ - 20%-39% impaired, limited or restricted  - D/C STATUS:  CJ - 20%-39% impaired, limited or restricted Activity Tolerance: Pt with good activity tolerance, able to participate in all activity throughout session Please refer to the flowsheet for vital signs taken during this treatment. After treatment:  
[x]   Patient left in no apparent distress sitting up in chair 
[]   Patient left in no apparent distress in bed 
[x]   Call bell left within reach [x]   Nursing notified 
[x]   Caregiver present [x]   Bed alarm activated COMMUNICATION/EDUCATION:  
Communication/Collaboration: [x]   Fall prevention education was provided and the patient/caregiver indicated understanding. [x]   Patient/family have participated as able and agree with findings and recommendations. []   Patient is unable to participate in plan of care at this time. Findings and recommendations were discussed with: Registered Nurse Thank you for this referral. 
Marnette Brittle, SPT Time Calculation: 39 mins Regarding student involvement in patient care: A student participated in this treatment session. Per CMS Medicare statements and APTA guidelines I certify that the following was true: 1. I was present and directly observed the entire session. 2. I made all skilled judgments and clinical decisions regarding care. 3. I am the practitioner responsible for assessment, treatment, and documentation.

## 2018-10-30 NOTE — PROGRESS NOTES
Bedside and Verbal shift change report given to Cami Jones (oncoming nurse) by Ilan Fournier (offgoing nurse). Report included the following information SBAR, Kardex and MAR.

## 2018-10-30 NOTE — PROGRESS NOTES
Problem: Falls - Risk of 
Goal: *Absence of Falls Document Lacie Lozano Fall Risk and appropriate interventions in the flowsheet. Outcome: Progressing Towards Goal 
Fall Risk Interventions: 
Mobility Interventions: Bed/chair exit alarm, Patient to call before getting OOB Medication Interventions: Bed/chair exit alarm, Patient to call before getting OOB Elimination Interventions: Call light in reach, Bed/chair exit alarm

## 2018-10-30 NOTE — PROGRESS NOTES
Speech pathology note Patient admitted with confusion, and yesterday patient with jargon, decreased content, and confabulations during swallowing evaluation. Basic language significantly improved today, now [facility]. Patient also less confused, now grossly oriented x4 with independent use of whiteboard to assist with answering orientation questions. MRI completed and showed, \"No evidence of intracranial metastatic disease. No intracranial mass, hemorrhage or evidence of acute infarction. Normal MRI of the brain. Maxillary sinus disease. No aneurysm, dissection, hemodynamically significant stenosis or major vessel occlusion. \" Further language/cognitive evaluation not clinically indicated at this time. Will sign off. SLP evaluated patient's swallow function yesterday and patient is safe for a regular/thin liquid diet from an oropharyngeal swallow standpoint. Recommend advance diet when medically appropriate.   
 
Fuentes Reddy., CCC-SLP

## 2018-10-30 NOTE — PROGRESS NOTES
0700: Bedside shift change report given to Raven Arellano RN (oncoming nurse) by Collin Brooke RN (offgoing nurse). Report included the following information SBAR, Kardex, ED Summary, Intake/Output, MAR and Recent Results. 6420: Pt transported off floor via stretcher to [facility]

## 2018-10-31 ENCOUNTER — APPOINTMENT (OUTPATIENT)
Dept: CT IMAGING | Age: 38
DRG: 641 | End: 2018-10-31
Attending: INTERNAL MEDICINE
Payer: SELF-PAY

## 2018-10-31 LAB
ANION GAP SERPL CALC-SCNC: 9 MMOL/L (ref 5–15)
BASOPHILS # BLD: 0 K/UL (ref 0–0.1)
BASOPHILS NFR BLD: 0 % (ref 0–1)
BUN SERPL-MCNC: 4 MG/DL (ref 6–20)
BUN/CREAT SERPL: 6 (ref 12–20)
CALCIUM SERPL-MCNC: 9 MG/DL (ref 8.5–10.1)
CHLORIDE SERPL-SCNC: 103 MMOL/L (ref 97–108)
CO2 SERPL-SCNC: 22 MMOL/L (ref 21–32)
CREAT SERPL-MCNC: 0.64 MG/DL (ref 0.7–1.3)
DIFFERENTIAL METHOD BLD: ABNORMAL
EOSINOPHIL # BLD: 1.2 K/UL (ref 0–0.4)
EOSINOPHIL NFR BLD: 13 % (ref 0–7)
ERYTHROCYTE [DISTWIDTH] IN BLOOD BY AUTOMATED COUNT: 12.9 % (ref 11.5–14.5)
GLUCOSE BLD STRIP.AUTO-MCNC: 110 MG/DL (ref 65–100)
GLUCOSE BLD STRIP.AUTO-MCNC: 90 MG/DL (ref 65–100)
GLUCOSE SERPL-MCNC: 76 MG/DL (ref 65–100)
HCT VFR BLD AUTO: 27.9 % (ref 36.6–50.3)
HGB BLD-MCNC: 9 G/DL (ref 12.1–17)
IMM GRANULOCYTES # BLD: 0 K/UL (ref 0–0.04)
IMM GRANULOCYTES NFR BLD AUTO: 0 % (ref 0–0.5)
LYMPHOCYTES # BLD: 1.7 K/UL (ref 0.8–3.5)
LYMPHOCYTES NFR BLD: 18 % (ref 12–49)
MCH RBC QN AUTO: 26.2 PG (ref 26–34)
MCHC RBC AUTO-ENTMCNC: 32.3 G/DL (ref 30–36.5)
MCV RBC AUTO: 81.3 FL (ref 80–99)
MONOCYTES # BLD: 0.7 K/UL (ref 0–1)
MONOCYTES NFR BLD: 8 % (ref 5–13)
NEUTS SEG # BLD: 5.6 K/UL (ref 1.8–8)
NEUTS SEG NFR BLD: 61 % (ref 32–75)
NRBC # BLD: 0 K/UL (ref 0–0.01)
NRBC BLD-RTO: 0 PER 100 WBC
PLATELET # BLD AUTO: 421 K/UL (ref 150–400)
PMV BLD AUTO: 9 FL (ref 8.9–12.9)
POTASSIUM SERPL-SCNC: 3.3 MMOL/L (ref 3.5–5.1)
RBC # BLD AUTO: 3.43 M/UL (ref 4.1–5.7)
RBC MORPH BLD: ABNORMAL
SERVICE CMNT-IMP: ABNORMAL
SERVICE CMNT-IMP: NORMAL
SODIUM SERPL-SCNC: 134 MMOL/L (ref 136–145)
WBC # BLD AUTO: 9.2 K/UL (ref 4.1–11.1)

## 2018-10-31 PROCEDURE — 36415 COLL VENOUS BLD VENIPUNCTURE: CPT | Performed by: EMERGENCY MEDICINE

## 2018-10-31 PROCEDURE — 74011250637 HC RX REV CODE- 250/637: Performed by: INTERNAL MEDICINE

## 2018-10-31 PROCEDURE — 74011000255 HC RX REV CODE- 255: Performed by: INTERNAL MEDICINE

## 2018-10-31 PROCEDURE — 97110 THERAPEUTIC EXERCISES: CPT | Performed by: OCCUPATIONAL THERAPIST

## 2018-10-31 PROCEDURE — 74177 CT ABD & PELVIS W/CONTRAST: CPT

## 2018-10-31 PROCEDURE — 85025 COMPLETE CBC W/AUTO DIFF WBC: CPT | Performed by: INTERNAL MEDICINE

## 2018-10-31 PROCEDURE — 71260 CT THORAX DX C+: CPT

## 2018-10-31 PROCEDURE — 97535 SELF CARE MNGMENT TRAINING: CPT | Performed by: OCCUPATIONAL THERAPIST

## 2018-10-31 PROCEDURE — 74011250636 HC RX REV CODE- 250/636: Performed by: INTERNAL MEDICINE

## 2018-10-31 PROCEDURE — 74011000258 HC RX REV CODE- 258: Performed by: INTERNAL MEDICINE

## 2018-10-31 PROCEDURE — 80048 BASIC METABOLIC PNL TOTAL CA: CPT | Performed by: EMERGENCY MEDICINE

## 2018-10-31 PROCEDURE — 65660000000 HC RM CCU STEPDOWN

## 2018-10-31 PROCEDURE — 82962 GLUCOSE BLOOD TEST: CPT

## 2018-10-31 PROCEDURE — 74011636320 HC RX REV CODE- 636/320: Performed by: INTERNAL MEDICINE

## 2018-10-31 PROCEDURE — 74011000258 HC RX REV CODE- 258: Performed by: EMERGENCY MEDICINE

## 2018-10-31 PROCEDURE — 74011250636 HC RX REV CODE- 250/636: Performed by: EMERGENCY MEDICINE

## 2018-10-31 RX ORDER — SODIUM CHLORIDE 9 MG/ML
50 INJECTION, SOLUTION INTRAVENOUS
Status: COMPLETED | OUTPATIENT
Start: 2018-10-31 | End: 2018-10-31

## 2018-10-31 RX ORDER — METRONIDAZOLE 500 MG/100ML
500 INJECTION, SOLUTION INTRAVENOUS EVERY 12 HOURS
Status: DISCONTINUED | OUTPATIENT
Start: 2018-10-31 | End: 2018-10-31

## 2018-10-31 RX ORDER — SODIUM CHLORIDE 0.9 % (FLUSH) 0.9 %
10 SYRINGE (ML) INJECTION
Status: COMPLETED | OUTPATIENT
Start: 2018-10-31 | End: 2018-10-31

## 2018-10-31 RX ORDER — BARIUM SULFATE 20 MG/ML
900 SUSPENSION ORAL
Status: COMPLETED | OUTPATIENT
Start: 2018-10-31 | End: 2018-10-31

## 2018-10-31 RX ORDER — LIDOCAINE HYDROCHLORIDE 20 MG/ML
10 INJECTION, SOLUTION INFILTRATION; PERINEURAL ONCE
Status: DISCONTINUED | OUTPATIENT
Start: 2018-10-31 | End: 2018-11-01

## 2018-10-31 RX ADMIN — SODIUM CHLORIDE 50 ML/HR: 900 INJECTION, SOLUTION INTRAVENOUS at 17:59

## 2018-10-31 RX ADMIN — ENOXAPARIN SODIUM 40 MG: 40 INJECTION, SOLUTION INTRAVENOUS; SUBCUTANEOUS at 08:34

## 2018-10-31 RX ADMIN — Medication 10 ML: at 03:33

## 2018-10-31 RX ADMIN — IOPAMIDOL 100 ML: 755 INJECTION, SOLUTION INTRAVENOUS at 17:59

## 2018-10-31 RX ADMIN — SODIUM CHLORIDE 50 ML/HR: 900 INJECTION, SOLUTION INTRAVENOUS at 11:05

## 2018-10-31 RX ADMIN — Medication 10 ML: at 17:59

## 2018-10-31 RX ADMIN — PIPERACILLIN SODIUM,TAZOBACTAM SODIUM 3.38 G: 3; .375 INJECTION, POWDER, FOR SOLUTION INTRAVENOUS at 11:05

## 2018-10-31 RX ADMIN — LEVOFLOXACIN 750 MG: 5 INJECTION, SOLUTION INTRAVENOUS at 06:05

## 2018-10-31 RX ADMIN — BARIUM SULFATE 900 ML: 20 SUSPENSION ORAL at 11:06

## 2018-10-31 RX ADMIN — CEFEPIME HYDROCHLORIDE 2 G: 2 INJECTION, POWDER, FOR SOLUTION INTRAVENOUS at 03:31

## 2018-10-31 RX ADMIN — ACETAMINOPHEN 650 MG: 325 TABLET ORAL at 08:37

## 2018-10-31 RX ADMIN — Medication 10 ML: at 15:14

## 2018-10-31 RX ADMIN — PIPERACILLIN SODIUM,TAZOBACTAM SODIUM 3.38 G: 3; .375 INJECTION, POWDER, FOR SOLUTION INTRAVENOUS at 18:36

## 2018-10-31 RX ADMIN — ASPIRIN 81 MG CHEWABLE TABLET 81 MG: 81 TABLET CHEWABLE at 08:33

## 2018-10-31 NOTE — PROGRESS NOTES
Chief Complaint: altered mental status Feels better today more clear of mind. Discussed EEG and MRI results. Appreciate oncology assistance. Assesment and Plan 1. Dysarthria 
improving May discharge with oncology follow up 
  2.Laryngitis On levoquin 
  
3. Metastatic melanoma Will follow with [facility] 
  
4. Altered mental status EEG - slow should improve with clinical condition.  
  
5. Fatigue May be related to infection Allergies Patient has no known allergies. Medications Current Facility-Administered Medications Medication Dose Route Frequency  sodium chloride (NS) flush 10 mL  10 mL IntraVENous RAD ONCE  
 iopamidol (ISOVUE-370) 76 % injection 100 mL  100 mL IntraVENous RAD ONCE  piperacillin-tazobactam (ZOSYN) 3.375 g in 0.9% sodium chloride (MBP/ADV) 100 mL  3.375 g IntraVENous Q8H  
 0.9% sodium chloride infusion  75 mL/hr IntraVENous CONTINUOUS  
 sodium chloride (NS) flush 5-10 mL  5-10 mL IntraVENous Q8H  
 sodium chloride (NS) flush 5-10 mL  5-10 mL IntraVENous PRN  
 acetaminophen (TYLENOL) tablet 650 mg  650 mg Oral Q4H PRN Or  
 acetaminophen (TYLENOL) solution 650 mg  650 mg Per NG tube Q4H PRN Or  
 acetaminophen (TYLENOL) suppository 650 mg  650 mg Rectal Q4H PRN  
 aspirin chewable tablet 81 mg  81 mg Oral DAILY  magnesium hydroxide (MILK OF MAGNESIA) 400 mg/5 mL oral suspension 30 mL  30 mL Oral DAILY PRN  
 enoxaparin (LOVENOX) injection 40 mg  40 mg SubCUTAneous Q24H  
 ondansetron (ZOFRAN) injection 4 mg  4 mg IntraVENous Q4H PRN  polyethylene glycol (MIRALAX) packet 17 g  17 g Oral DAILY Medical History Past Medical History:  
Diagnosis Date  Cancer ([facility] ROS Confusion No headache Left lower extremity weakness Fatigue 5 systems reviewed and are negative unless otherwise listed above. Exam: 
 
Visit Vitals BP 97/69 (BP 1 Location: Right arm, BP Patient Position: Supine) Pulse 94 Temp 97.5 °F (36.4 °C) Resp 20 Ht 5' 8\" (1.727 m) Wt 154 lb 12.2 oz (70.2 kg) SpO2 100% BMI 23.53 kg/m² General: Well developed, well nourished. Patient in no apparent distress Head: Normocephalic, atraumatic, anicteric sclera Film on tongue and inflamed lingula Neck Normal ROM, No thyromegally Lungs:  Clear to auscultation bilaterally, No wheezes or rubs Cardiac: Regular rate and rhythm with no murmurs. Abd: Bowel sounds were audible. No tenderness on palpation Ext: No pedal edema Skin: Supple no rash. Healed surgical scar lateral left knee and left groin  
  
NeurologicExam: 
Mental Status: Alert and oriented to person place and time less confused Speech: Fluent dysarthric Cranial Nerves:  II - XII Intact Motor:  Full and symmetric strength of upper and lower proximal and distal muscles. Normal bulk and tone. Slight weakness left hip flexors Reflexes:   Deep tendon reflexes 2+/4 and symmetric. Sensory:   Symmetric and intact with no perceived deficits modalities involving small or large fibers. Gait:  Gait deferred. Tremor:   No tremor noted. Cerebellar:  Coordination intact. Neurovascular: No carotid bruits. No JVD Imaging CT Results (most recent): 
Results from [facility] Encounter encounter on 10/28/18 CT HEAD WO CONT Narrative EXAM:  CT HEAD WO CONT INDICATION:   Confusion/delirium, altered LOC, unexplained, lethargy and 
confusion. Symptoms for 2 weeks. COMPARISON: None. CONTRAST:  None. TECHNIQUE: Unenhanced CT of the head was performed using 5 mm images. Brain and 
bone windows were generated. CT dose reduction was achieved through use of a 
standardized protocol tailored for this examination and automatic exposure 
control for dose modulation. FINDINGS: 
The ventricles and sulci are normal in size, shape and configuration and 
midline. There is no significant white matter disease.  There is no intracranial 
 hemorrhage, extra-axial collection, mass, mass effect or midline shift. The 
basilar cisterns are open. No acute infarct is identified. The bone windows 
demonstrate no abnormalities. The visualized portions of the paranasal sinuses 
and mastoid air cells are clear. Impression IMPRESSION: Unremarkable CT of the head. MRI Results (most recent): 
Results from Hospital Encounter encounter on 10/28/18 MRA NECK W CONT Narrative CLINICAL HISTORY: Altered mental status, history of melanoma as well INDICATION: ALTERED MENTAL STATUS METASTATIC  MELANOMA. COMPARISON: CT 10/29/2018 TECHNIQUE: MR examination of the brain includes axial and sagittal T1 , axial 
T2, axial FLAIR, axial gradient echo, axial DWI, coronal T2. Contrast: The patient was administered gadolinium-based contrast material , 
axial and sagittal T1-weighted postcontrast enhanced imaging was obtained. Next, 3-D time-of-flight MRA of the brain was performed. Multiplanar 
reconstructions were obtained. Next, Contrast enhanced 2-D time-of-flight MRA of the neck was performed. Multiplanar reconstructions were obtained. FINDINGS:  
There is no intracranial mass, hemorrhage or evidence of acute infarction. Mucoperiosteal thickening in the right left maxillary sinuses. Mild sphenoid and 
frontal sinus disease as well. There is no evidence of intracranial metastatic 
disease. There is no abnormal parenchymal enhancement. There is no abnormal meningeal 
enhancement. There is no Chiari or syrinx. The pituitary and infundibulum are 
grossly unremarkable. There is no skull base mass. Cerebellopontine angles are 
grossly unremarkable. The major intracranial vascular flow-voids are 
unremarkable. The cavernous sinuses are symmetric. Optic chiasm and infundibulum grossly 
unremarkable. Orbits are grossly symmetric. Dural venous sinuses are grossly 
patent. The brain architecture is normal. There is no evidence of midline shift or mass-effect. There are no extra-axial fluid collections. The mastoid air 
cells are well pneumatized and clear. A 2 segments are within normal limits. A1 segment on the right is hypoplastic. M1 and M2 segments are within normal limits with symmetric arborization. Left 
vertebral artery is dominant. P1 segments on the left within normal limits. Fetal circulation to the posterior cerebral artery on the right. . The basilar 
artery and its branches are normal. The internal carotid, anterior cerebral, and 
middle cerebral arteries are patent. There is no flow-limiting intracranial 
stenosis. There is no aneurysm. . 
 
Dominant left vertebral artery. . The bilateral subclavian, common carotid, and 
internal carotid arteries are patent with no flow-limiting stenosis. The 
vertebral arteries are codominant and patent. Impression IMPRESSION:  
No evidence of intracranial metastatic disease. No intracranial mass, hemorrhage or evidence of acute infarction. Normal MRI of the brain. Maxillary sinus disease. No aneurysm, dissection, hemodynamically significant stenosis or major vessel 
occlusion. Lab Review Lab Results Component Value Date/Time WBC 9.2 10/31/2018 08:44 AM  
 HCT 27.9 (L) 10/31/2018 08:44 AM  
 HGB 9.0 (L) 10/31/2018 08:44 AM  
 PLATELET 121 (H) 40/58/0099 08:44 AM  
 
 
Lab Results Component Value Date/Time Sodium 134 (L) 10/31/2018 08:44 AM  
 Potassium 3.3 (L) 10/31/2018 08:44 AM  
 Chloride 103 10/31/2018 08:44 AM  
 CO2 22 10/31/2018 08:44 AM  
 Glucose 76 10/31/2018 08:44 AM  
 BUN 4 (L) 10/31/2018 08:44 AM  
 Creatinine 0.64 (L) 10/31/2018 08:44 AM  
 Calcium 9.0 10/31/2018 08:44 AM  
 
 
Lab Results Component Value Date/Time Hemoglobin A1c 5.2 10/30/2018 02:53 AM  
  
 
Lab Results Component Value Date/Time Vitamin B12 743 10/30/2018 06:02 PM  
 
 
 
Lab Results Component Value Date/Time  Cholesterol, total 87 10/30/2018 02:53 AM  
 HDL Cholesterol 13 10/30/2018 02:53 AM  
 LDL, calculated 48 10/30/2018 02:53 AM  
 VLDL, calculated 26 10/30/2018 02:53 AM  
 Triglyceride 130 10/30/2018 02:53 AM  
 CHOL/HDL Ratio 6.7 (H) 10/30/2018 02:53 AM

## 2018-10-31 NOTE — PROGRESS NOTES
Bedside shift change report given Jesse VEE . Report included the following information SBAR. 
  
Zone Phone:  
  
  
Significant changes during shift:None  
  
Patient Information 
  
Figu[place]a Mills 
41 y.o. 
10/28/2018 11:56 PM by Jamarcus Rodriguez MD. Kiran Guerrero was admitted from Home 
  
Problem List 
  
       
Patient Active Problem List  
  Diagnosis Date Noted  SIRS (systemic inflammatory response syndrome) ([facility] [address] 10/29/2018  
  
       
Past Medical History:  
Diagnosis Date  Cancer ([facility] [facility]    
  
  
  
Core Measures: 
  
CVA: Yes  
  
Activity Status: 
  
OOB to Chair No 
Ambulated this shift No  
Bed Rest No 
  
Supplemental E0: (UZ Applicable) 
  
Room air 
  
  
LINES AND DRAINS: 
  
CDVT prophylaxis: 
  
DVT prophylaxis Med- Yes DVT prophylaxis SCD or JUSTA- No  
  
Wounds: (If Applicable) 
  
Wounds- No 
  
Location  
  
Patient Safety: 
  
Falls Score Total Score: 4 Safety Level_______ Bed Alarm On? Yes Sitter? No 
  
Plan for upcoming shift: CT abd/ pelvis and CT chest 
  
  
Discharge Plan: TBD after testing and evals 
  
Active Consults: 
IP CONSULT TO HOSPITALIST 
IP CONSULT TO NEUROLOGY 
IP CONSULT TO ONCOLOGY 
  
  
  
  
 
  
  
Revision History

## 2018-10-31 NOTE — PROGRESS NOTES
Reason for Admission:  SIRS   
                
RRAT Score:     4 - low risk Plan for utilizing home health:  Pending therapy recommendations at discharge Likelihood of Readmission:  moderate Transition of Care Plan:  Home at his mother's home with f/u appts Pt is a 41 y.o  male admitted with SIRS. Pt was alert, oriented resting in bed. Demographic information verified and all is correct. Pt was living alone in a 1 story home in [place] prior to admission. Pt states he was dx with cancer in July 2018 and had tx in mid September. Prior to getting sick pt states he was independent with his ADL's and IADL's, driving and working at the [employer] in [place]. Pt goes to the [facility] in [place] for his medical care. Pt doesn't have insurance and this CM gave pt the BS care card application. CM emailed June James with [employer] to screen pt for Medicaid. Pt denies having home health and rehab. Preferred pharmacy is at [facility] in [place]. Pt states he will stay with his mother at discharge. Pt's mother lives in [place] will f/u with mother later today. Pt's mother can transport pt at discharge by car. CM will continue to follow pt for discharge planning needs. Care Management Interventions PCP Verified by CM: Yes([facility] in [place]) Mode of Transport at Discharge: Other (see comment)(pt's mother will transport by car) Transition of Care Consult (CM Consult): Discharge Planning Discharge Durable Medical Equipment: No 
Physical Therapy Consult: Yes Occupational Therapy Consult: Yes Speech Therapy Consult: Yes Current Support Network: Lives Alone, Own Home(lives alone in a 1 story home ) Confirm Follow Up Transport: Family Discharge Location Discharge Placement: Home [place] [place], [address]

## 2018-10-31 NOTE — PROGRESS NOTES
Hospitalist Progress Note NAME: Richard Ronquillo :  1980 MRN:  [id number] Assessment / Plan: SIRS with leukocytosis, tachycardia and hypotension and fevers at home POA with lactic acidosis POA 
R/o Sepsis POA Acute Encephalopathy POA Suspect due to dehydration in settings of chemotherapy for metastatic melanoma R/o infectious etiology due to immunocompromised status Pt reported low grade fever around 100 for last couple of month, fevers could be due to tumor fever R/o groin abscess and cellulitis, will ask surgery consult. US with Hematoma or complex seroma adjacent to the common femoral vein UA and CXR negative Check CT Chest and Abd/Pelvis to rule out mets vs infection D/c Cefepime and Levaquin for now and switch abx to zosyn Blood cultures remain negative till date MRI brain without acute CVA or mets Neurology and oncology on the case EEG negative for seizures TFTs consistent with sick thyroid, will need repeat TFTs in 4-6 weeks as an OP Ammonia and B12 WNL 
PT/OT/Speech UDS positive for opiates and THC Rt groin pain s/p surgery US with Hematoma or complex seroma adjacent to the common femoral vein  
Will ask surgery input 
  
Hyponatremia Improving with IVF Continue to monitor 
  
Code Status: Full code Surrogate Decision Maker:mother [id number] 
[phone] 
  
DVT Prophylaxis: lovenox GI Prophylaxis: not indicated 
  
Baseline: ambulatory Subjective: Pt seen and examined at bedside. NAD. Fair insight, low grade fever again today. Overnight events d/w RN  
CHIEF COMPLAINT: f/u \"Lethargy\"  
  
Review of Systems: 
Symptom Y/N Comments  Symptom Y/N Comments Fever/Chills n   Chest Pain n   
Poor Appetite    Edema Cough n   Abdominal Pain n   
Sputum    Joint Pain SOB/HOFFMAN n   Pruritis/Rash Nausea/vomit    Tolerating PT/OT Diarrhea    Tolerating Diet y Constipation    Other Could NOT obtain due to:   
 
Objective: VITALS:  
Last 24hrs VS reviewed since prior progress note. Most recent are: 
Patient Vitals for the past 24 hrs: 
 Temp Pulse Resp BP SpO2  
10/31/18 0810 100.1 °F (37.8 °C) (!) 101 20 107/67 96 % 10/31/18 0002 99.7 °F (37.6 °C) 96 17 107/66 97 % 10/30/18 2059 99.3 °F (37.4 °C) 95 17 100/59 96 % 10/30/18 1534 98.4 °F (36.9 °C) 98 16 98/62 96 % 10/30/18 1525 98.4 °F (36.9 °C) (!) 104 18 95/57 95 % 10/30/18 1251 97.8 °F (36.6 °C) 84 18 109/73 99 % No intake or output data in the 24 hours ending 10/31/18 1002 PHYSICAL EXAM: 
General: WD, WN. Alert, cooperative, no acute distress   
EENT:  EOMI. Anicteric sclerae. MMM Resp:  CTA bilaterally, no wheezing or rales. No accessory muscle use CV:  Regular  rhythm,  No edema GI:  Soft, Non distended, Non tender.  +Bowel sounds Neurologic:  Alert and oriented X 3, normal speech, Psych:   Good insight. Not anxious nor agitated Skin:  R groin swelling, No rashes. No jaundice Reviewed most current lab test results and cultures  YES Reviewed most current radiology test results   YES Review and summation of old records today    NO Reviewed patient's current orders and MAR    YES 
PMH/ reviewed - no change compared to H&P 
________________________________________________________________________ Care Plan discussed with: 
  Comments Patient y Family RN y   
Care Manager Consultant Multidiciplinary team rounds were held today with , nursing, pharmacist and clinical coordinator. Patient's plan of care was discussed; medications were reviewed and discharge planning was addressed. ________________________________________________________________________ Total NON critical care TIME:  35  Minutes Total CRITICAL CARE TIME Spent:   Minutes non procedure based Comments >50% of visit spent in counseling and coordination of care ________________________________________________________________________ Sondra Velasco MD  
 
Procedures: see electronic medical records for all procedures/Xrays and details which were not copied into this note but were reviewed prior to creation of Plan. LABS: 
I reviewed today's most current labs and imaging studies. Pertinent labs include: 
Recent Labs 10/31/18 
3169 10/30/18 
2774 10/28/18 
2201 WBC 9.2 9.2 14.3* HGB 9.0* 8.4* 11.6* HCT 27.9* 26.6* 37.0 * 461* 633* Recent Labs 10/31/18 
7930 10/30/18 
8236 10/29/18 
5467 10/28/18 
2201 * 136  --  131*  
K 3.3* 3.7  --  4.2  104  --  96* CO2 22 22  --  21  
GLU 76 82  --  83 BUN 4* 10  --  18  
CREA 0.64* 0.65*  --  0.93  
CA 9.0 9.7  --  12.2* ALB  --   --   --  3.2* TBILI  --   --   --  0.7 SGOT  --   --   --  32 ALT  --   --   --  19 INR  --   --  1.5*  --   
 
 
Signed: Sondra Velasco MD

## 2018-10-31 NOTE — ROUTINE PROCESS
* No surgery found * Bedside shift change report given Jaqueline to (oncoming nurse)  Lillian Silverman. Report included the following information SBAR. 
  
Zone UAEM 
  
  
Significant changes during shift:  Pt had 2 liquid stool, low grade temp. 99.7 highest. 
  
  
  
Patient Information 
  
Marsha Rodriguez 
41 y.o. 
10/28/2018 11:56 PM by Juana Martins MD. Kiran Guerrero was admitted from Home 
  
Problem List 
  
       
Patient Active Problem List  
  Diagnosis Date Noted  SIRS (systemic inflammatory response syndrome) ([facility] 10/29/2018  
  
       
Past Medical History:  
Diagnosis Date  Cancer ([facility]    
  
  
  
Core Measures: 
  
CVA: Yes  
  
Activity Status: 
  
OOB to Chair No 
Ambulated this shift No  
Bed Rest No 
  
Supplemental O0: (EM Applicable) 
  
Room air 
  
  
LINES AND DRAINS: 
  
CDVT prophylaxis: 
  
DVT prophylaxis Med- Yes DVT prophylaxis SCD or JUSTA- No  
  
Wounds: (If Applicable) 
  
Wounds- No 
  
Location  
  
Patient Safety: 
  
Falls Score Total Score: 4 Safety Level_______ Bed Alarm On? Yes Sitter? No 
  
Plan for upcoming shift: monitor, possible DC tomorrow  
  
  
Discharge Plan: TBD after testing and evals 
  
Active Consults: 
IP CONSULT TO HOSPITALIST 
IP CONSULT TO NEUROLOGY 
IP CONSULT TO ONCOLOGY

## 2018-10-31 NOTE — PROGRESS NOTES
Nutrition Assessment: 
 
INTERVENTIONS/RECOMMENDATIONS:  
Meals/Snacks: General/healthful diet: Regular diet Supplements: Commercial supplement: Ensure clear BID ASSESSMENT:  
Chart reviewed; patient receiving clear liquids during visit. He reports tolerating diet well and ready to try solid foods. Drinking ensure clear 1/2 a bottle at a time. He reports weight loss PTA d/t fatigue. Eating 2 meals/day but sometimes these were small meals. Had not tried supplements PTA. Discussed supplements available to help with kcal/protein intake at home. No nutrition questions/concerns at this time. Diet advanced to regular shortly after visit. Diet Order: Regular (ensure clear BID) % Eaten:   
Patient Vitals for the past 72 hrs: 
 % Diet Eaten 10/29/18 1850 100 % 10/29/18 1335 50 % Pertinent Medications: [x] Reviewed []Other: Miralax Pertinent Labs: [x]Reviewed  []Other: 
Food Allergies: [x]None []Other:   Last BM: 10/31  [x]Active     []Hyperactive  []Hypoactive       [] Absent  BS Skin:    [x] Intact   [] Incision  [] Breakdown   []Edema   []Other: Anthropometrics: Height: 5' 8\" (172.7 cm) Weight: 70.2 kg (154 lb 12.2 oz) IBW (%IBW):   ( ) UBW (%UBW):   (  %) BMI: Body mass index is 23.53 kg/m². This BMI is indicative of: 
[]Underweight   [x]Normal   []Overweight   [] Obesity   [] Extreme Obesity (BMI>40) Last Weight Metrics: 
Weight Loss Metrics 10/28/2018 Today's Wt 154 lb 12.2 oz  
BMI 23.53 kg/m2 Estimated Nutrition Needs (Based on): 2079 Kcals/day(BMR (1600) x 1. 3AF) , 84 g(1.2 g/kg bw) Protein Carbohydrate: At Least 130 g/day  Fluids: 2100 mL/day Pt expected to meet estimated nutrient needs: [x]Yes []No 
 
NUTRITION DIAGNOSES:  
Problem:  Predicted suboptimal energy intake Etiology: related to current medical condition Signs/Symptoms: as evidenced by MST, AMS NUTRITION INTERVENTIONS: 
Meals/Snacks: General/healthful diet   Supplements: Commercial supplement GOAL:  
PO intake >70% of meals next 4-6 days NUTRITION MONITORING AND EVALUATION Food/Nutrient Intake Outcomes: Total energy intake Physical Signs/Symptoms Outcomes: Weight/weight change, Electrolyte and renal profile Previous Goal Met: 
 [x] Met              [] Progressing Towards Goal              [] Not Progressing Towards Goal  
Previous Recommendations: 
 [x] Implemented          [] Not Implemented          [] Not Applicable LEARNING NEEDS (Diet, Food/Nutrient-Drug Interaction):  
 [x] None Identified 
 [] Identified and Education Provided/Documented 
 [] Identified and Pt declined/was not appropriate Cultural, Orthodox, OR Ethnic Dietary Needs:  
 [x] None Identified 
 [] Identified and Addressed 
 
 [x] Interdisciplinary Care Plan Reviewed/Documented  
 [x] Discharge Planning: Regular diet + ONS As needed  
 [] Participated in Interdisciplinary Rounds NUTRITION RISK:  
 [] High              [] Moderate           [x]  Low  []  Minimal/Uncompromised Ashwin Pair Pager [phone] Weekend Pager [phone]

## 2018-10-31 NOTE — PROGRESS NOTES
Hematology Oncology Progress Note Follow up for: Met melanoma Chart notes reviewed since last visit. Case discussed with following: 
Patient complains of the following: No complaints Additional concerns noted by the staff:  
 
Patient Vitals for the past 24 hrs: 
 BP Temp Pulse Resp SpO2  
10/31/18 0810 107/67 100.1 °F (37.8 °C) (!) 101 20 96 % 10/31/18 0002 107/66 99.7 °F (37.6 °C) 96 17 97 % 10/30/18 2059 100/59 99.3 °F (37.4 °C) 95 17 96 % 10/30/18 1534 98/62 98.4 °F (36.9 °C) 98 16 96 % 10/30/18 1525 95/57 98.4 °F (36.9 °C) (!) 104 18 95 % 10/30/18 1251 109/73 97.8 °F (36.6 °C) 84 18 99 % Review of Systems: 
12 point ROS done and negative except as above Physical Examination: 
Gen NAD 
CV reg Lungs clear 
abd benign Labs: 
Recent Results (from the past 24 hour(s)) T4, FREE Collection Time: 10/30/18  6:02 PM  
Result Value Ref Range T4, Free 1.5 0.8 - 1.5 NG/DL  
TSH 3RD GENERATION Collection Time: 10/30/18  6:02 PM  
Result Value Ref Range TSH 0.09 (L) 0.36 - 3.74 uIU/mL AMMONIA Collection Time: 10/30/18  6:02 PM  
Result Value Ref Range Ammonia 13 <32 UMOL/L  
VITAMIN B12 Collection Time: 10/30/18  6:02 PM  
Result Value Ref Range Vitamin B12 743 193 - 986 pg/mL GLUCOSE, POC Collection Time: 10/30/18  9:52 PM  
Result Value Ref Range Glucose (POC) 80 65 - 100 mg/dL Performed by Dillon Hudson (PCT) GLUCOSE, POC Collection Time: 10/31/18  6:42 AM  
Result Value Ref Range Glucose (POC) 90 65 - 100 mg/dL Performed by Dillon Hudson (PCT) METABOLIC PANEL, BASIC Collection Time: 10/31/18  8:44 AM  
Result Value Ref Range Sodium 134 (L) 136 - 145 mmol/L Potassium 3.3 (L) 3.5 - 5.1 mmol/L Chloride 103 97 - 108 mmol/L  
 CO2 22 21 - 32 mmol/L Anion gap 9 5 - 15 mmol/L Glucose 76 65 - 100 mg/dL BUN 4 (L) 6 - 20 MG/DL  Creatinine 0.64 (L) 0.70 - 1.30 MG/DL  
 BUN/Creatinine ratio 6 (L) 12 - 20    
 GFR est AA >60 >60 ml/min/1.73m2 GFR est non-AA >60 >60 ml/min/1.73m2 Calcium 9.0 8.5 - 10.1 MG/DL  
CBC WITH AUTOMATED DIFF Collection Time: 10/31/18  8:44 AM  
Result Value Ref Range WBC 9.2 4.1 - 11.1 K/uL  
 RBC 3.43 (L) 4.10 - 5.70 M/uL HGB 9.0 (L) 12.1 - 17.0 g/dL HCT 27.9 (L) 36.6 - 50.3 % MCV 81.3 80.0 - 99.0 FL  
 MCH 26.2 26.0 - 34.0 PG  
 MCHC 32.3 30.0 - 36.5 g/dL  
 RDW 12.9 11.5 - 14.5 % PLATELET 553 (H) 977 - 400 K/uL MPV 9.0 8.9 - 12.9 FL  
 NRBC 0.0 0  WBC ABSOLUTE NRBC 0.00 0.00 - 0.01 K/uL NEUTROPHILS 61 32 - 75 % LYMPHOCYTES 18 12 - 49 % MONOCYTES 8 5 - 13 % EOSINOPHILS 13 (H) 0 - 7 % BASOPHILS 0 0 - 1 % IMMATURE GRANULOCYTES 0 0.0 - 0.5 % ABS. NEUTROPHILS 5.6 1.8 - 8.0 K/UL  
 ABS. LYMPHOCYTES 1.7 0.8 - 3.5 K/UL  
 ABS. MONOCYTES 0.7 0.0 - 1.0 K/UL  
 ABS. EOSINOPHILS 1.2 (H) 0.0 - 0.4 K/UL  
 ABS. BASOPHILS 0.0 0.0 - 0.1 K/UL  
 ABS. IMM. GRANS. 0.0 0.00 - 0.04 K/UL  
 DF AUTOMATED    
 RBC COMMENTS NORMOCYTIC, NORMOCHROMIC    
 
MRI brain No evidence of intracranial metastatic disease. 
  
No intracranial mass, hemorrhage or evidence of acute infarction. Normal MRI of the brain. 
  
Maxillary sinus disease. 
  
No aneurysm, dissection, hemodynamically significant stenosis or major vessel occlusion Assessment and Plan:  
Met melanoma 
-admitted with some confusion-better 
-records from [facility] reviewed 
-was on ipi/nivo for 5 cycles 
-PET after 5 cycles showed progression or possible pseudoprogression 
-Patient to f/u with [facility] after repeat scans to determine treatment 
-MRI of brain negative He needs to f/u with his oncologists at [facility] and at [facility]

## 2018-10-31 NOTE — PROGRESS NOTES
Discussed aspiration of possible lymphocele for cultures. No evidence of infection on exam.   
He would like to discuss with his surgeon first.   
No urgent indications so will check back tomorrow after that conversation.

## 2018-10-31 NOTE — PROGRESS NOTES
Occupational Therapy Goals Initiated 10/30/2018 1. Patient will perform standing grooming independently, including set up, with independence within 7 day(s). 2.  Patient will utilize at least one strategy for improving memory during IADLs  with independence within 7 day(s). 3.  Patient will perform adl/self care routine demonstrating good planning and attention to task for at least 10 minutes with minimal verbal cues within 7 day(s). 4.  Patient will perform toilet transfers with independence within 7 day(s). 5.  Patient will perform all aspects of toileting with independence within 7 day(s). 6.  Patient will participate in upper extremity therapeutic exercise/activities with supervision/set-up for 10 minutes within 7 day(s). Occupational Therapy TREATMENT Patient: Catherine Valentin (86 y.o. male) Date: 10/31/2018 Diagnosis: SIRS (systemic inflammatory response syndrome) (HCC) <principal problem not specified> Precautions:   
Chart, occupational therapy assessment, plan of care, and goals were reviewed. ASSESSMENT: 
Pt reports that he is feeling better. Mother reports that pt seems more like his usual self and seems less confused today. Pt participated in seated therapeutic exercises demonstrating decreased attention for counting the repetitions silently (lost count, starting over with counting, etc.). Once pt focused on the counting and counted aloud, his focus improved and he was able to count 20 repetitions of BLE knee ext/flex while seated. Throughout the seated exercises, pt required cues for postural control (trunk extension, scapular adduction)  as his trunk/core is weak. Encouraged pt to be up in the chair for meals and throughout the day. Pt verbalized understanding. Pt ambulated to the bathroom with CGA/close S, performed toilet transfer with S and toileting independently.   Verbal cues needed for handwashing-to initiate the task and to locate the supplies needed. Sequencing was intact with cues. Educated mother on benefits of increasing attention to task for overall improved cognitive function. Progression toward goals: 
[]       Improving appropriately and progressing toward goals [x]       Improving slowly and progressing toward goals 
[]       Not making progress toward goals and plan of care will be adjusted PLAN: 
Patient continues to benefit from skilled intervention to address the above impairments. Continue treatment per established plan of care. Discharge Recommendations:  24 hour supervision/assistance and Outpatient SLP and OT. Further Equipment Recommendations for Discharge:  tbd SUBJECTIVE:  
Patient stated I'm feeling better.    \"I've been drinking that stuff and threw up. \" OBJECTIVE DATA SUMMARY:  
Cognitive/Behavioral Status: 
Neurologic State: Alert Orientation Level: Oriented to person;Oriented to place Cognition: Decreased attention/concentration; Follows commands; Impaired decision making;Memory loss Perception: Appears intact Perseveration: No perseveration noted(very distractible) Safety/Judgement: Decreased awareness of need for safety;Decreased insight into deficits;;Fall prevention Functional Mobility and Transfers for ADLs:Bed Mobility: 
Supine to Sit: Independent(assist with managing bed linens and IV line) Scooting: Independent Transfers: 
Sit to Stand: Supervision Functional Transfers Bathroom Mobility: Supervision/set up Toilet Transfer : Supervision;Stand-by assistance Balance: 
Sitting: Impaired Sitting - Static: Good (unsupported) Sitting - Dynamic: Fair (occasional) Standing: Intact Standing - Static: Good Standing - Dynamic : Fair ADL Intervention: Pt performed seated there. ex. Pt ambulated to the bathroom with S/CGA performed toilet transfer with S, toileting independently.   As above, pt needed cues to initiate handwashing task and locate supplies. Pt agreeable to sitting up in the chair in preparation for his meal.  
  
 
  
 
  
 
  
 
  
 
Toileting Toileting Assistance: Independent Clothing Management: Independent Cognitive Retraining Attention to Task: (encouraged by using counting aloud during ther. ex) Following Commands: Follows one step commands/directions Safety/Judgement: Decreased awareness of need for safety;Decreased insight into deficits;Driving appropriateness; Fall prevention Cues: Tactile cues provided;Verbal cues provided Therapeutic Exercises:  
Seated at EOB. Cues for postural control-trunk extension and scapular adduction. B shoulder flexion (20 reps)  Pt losing track of counting reps and starting over B pronation/supination for coordination.  (pt losing track of counting) Seated marching Knee flex ext alternating pattern--cues to focus on task and count aloud improved attention/concentration with good result. Pain: 
Pain Scale 1: Numeric (0 - 10) Pain Intensity 1: 0 Activity Tolerance: No complaints of fatigue After treatment:  
[x] Patient left in no apparent distress sitting up in chair-MD arrived and nsg 
[] Patient left in no apparent distress in bed 
[x] Call bell left within reach [x] Nursing present [x] Caregiver present 
[] Bed alarm activated COMMUNICATION/COLLABORATION:  
The patients plan of care was discussed with: Registered Nurse Jose Estrada OTR/L Time Calculation: 23 mins

## 2018-10-31 NOTE — PROGRESS NOTES
* No surgery found * Bedside shift change report given Aziza VEE . Report included the following information AR. 
  
[facility] Phone:  
  
  
Significant changes during shift: began drinking barium for CT at 11am 
  
  
  
Patient Information 
  
Martínez Collado 
41 y.o. 
10/28/2018 11:56 PM by Selina Shi MD. Kiran Guerrero was admitted from Home 
  
Problem List 
  
       
Patient Active Problem List  
  Diagnosis Date Noted  SIRS (systemic inflammatory response syndrome) ([facility] 10/29/2018  
  
       
Past Medical History:  
Diagnosis Date  Cancer ([facility]    
  
  
  
Core Measures: 
  
CVA: Yes  
  
Activity Status: 
  
OOB to Chair No 
Ambulated this shift No  
Bed Rest No 
  
Supplemental P2: (CA Applicable) 
  
Room air 
  
  
LINES AND DRAINS: 
  
CDVT prophylaxis: 
  
DVT prophylaxis Med- Yes DVT prophylaxis SCD or JUSTA- No  
  
Wounds: (If Applicable) 
  
Wounds- No 
  
Location  
  
Patient Safety: 
  
Falls Score Total Score: 4 Safety Level_______ Bed Alarm On? Yes Sitter? No 
  
Plan for upcoming shift: CT abd/ pelvis and CT chest 
  
  
Discharge Plan: TBD after testing and evals 
  
Active Consults: 
IP CONSULT TO HOSPITALIST 
IP CONSULT TO NEUROLOGY 
IP CONSULT TO ONCOLOGY

## 2018-11-01 VITALS
OXYGEN SATURATION: 99 % | HEIGHT: 68 IN | HEART RATE: 101 BPM | SYSTOLIC BLOOD PRESSURE: 99 MMHG | DIASTOLIC BLOOD PRESSURE: 63 MMHG | BODY MASS INDEX: 23.46 KG/M2 | WEIGHT: 154.76 LBS | RESPIRATION RATE: 20 BRPM | TEMPERATURE: 99.1 F

## 2018-11-01 LAB
ANION GAP SERPL CALC-SCNC: 12 MMOL/L (ref 5–15)
BASOPHILS # BLD: 0.1 K/UL (ref 0–0.1)
BASOPHILS NFR BLD: 1 % (ref 0–1)
BUN SERPL-MCNC: 3 MG/DL (ref 6–20)
BUN/CREAT SERPL: 5 (ref 12–20)
CALCIUM SERPL-MCNC: 9.3 MG/DL (ref 8.5–10.1)
CHLORIDE SERPL-SCNC: 101 MMOL/L (ref 97–108)
CO2 SERPL-SCNC: 21 MMOL/L (ref 21–32)
CREAT SERPL-MCNC: 0.66 MG/DL (ref 0.7–1.3)
DIFFERENTIAL METHOD BLD: ABNORMAL
EOSINOPHIL # BLD: 1.4 K/UL (ref 0–0.4)
EOSINOPHIL NFR BLD: 12 % (ref 0–7)
ERYTHROCYTE [DISTWIDTH] IN BLOOD BY AUTOMATED COUNT: 12.9 % (ref 11.5–14.5)
GLUCOSE SERPL-MCNC: 82 MG/DL (ref 65–100)
HCT VFR BLD AUTO: 28 % (ref 36.6–50.3)
HGB BLD-MCNC: 9 G/DL (ref 12.1–17)
IMM GRANULOCYTES # BLD: 0.1 K/UL (ref 0–0.04)
IMM GRANULOCYTES NFR BLD AUTO: 1 % (ref 0–0.5)
LYMPHOCYTES # BLD: 2 K/UL (ref 0.8–3.5)
LYMPHOCYTES NFR BLD: 18 % (ref 12–49)
MCH RBC QN AUTO: 25.8 PG (ref 26–34)
MCHC RBC AUTO-ENTMCNC: 32.1 G/DL (ref 30–36.5)
MCV RBC AUTO: 80.2 FL (ref 80–99)
MONOCYTES # BLD: 1.1 K/UL (ref 0–1)
MONOCYTES NFR BLD: 10 % (ref 5–13)
NEUTS SEG # BLD: 6.5 K/UL (ref 1.8–8)
NEUTS SEG NFR BLD: 59 % (ref 32–75)
NRBC # BLD: 0 K/UL (ref 0–0.01)
NRBC BLD-RTO: 0 PER 100 WBC
PLATELET # BLD AUTO: 459 K/UL (ref 150–400)
PMV BLD AUTO: 9.2 FL (ref 8.9–12.9)
POTASSIUM SERPL-SCNC: 3.2 MMOL/L (ref 3.5–5.1)
RBC # BLD AUTO: 3.49 M/UL (ref 4.1–5.7)
SODIUM SERPL-SCNC: 134 MMOL/L (ref 136–145)
T3 SERPL-MCNC: 155 NG/DL (ref 71–180)
WBC # BLD AUTO: 11.1 K/UL (ref 4.1–11.1)

## 2018-11-01 PROCEDURE — 74011250637 HC RX REV CODE- 250/637: Performed by: INTERNAL MEDICINE

## 2018-11-01 PROCEDURE — 80048 BASIC METABOLIC PNL TOTAL CA: CPT | Performed by: EMERGENCY MEDICINE

## 2018-11-01 PROCEDURE — 74011250636 HC RX REV CODE- 250/636: Performed by: INTERNAL MEDICINE

## 2018-11-01 PROCEDURE — 85025 COMPLETE CBC W/AUTO DIFF WBC: CPT | Performed by: INTERNAL MEDICINE

## 2018-11-01 PROCEDURE — 74011000258 HC RX REV CODE- 258: Performed by: INTERNAL MEDICINE

## 2018-11-01 PROCEDURE — 36415 COLL VENOUS BLD VENIPUNCTURE: CPT | Performed by: EMERGENCY MEDICINE

## 2018-11-01 RX ORDER — LIDOCAINE HYDROCHLORIDE 20 MG/ML
10 INJECTION, SOLUTION INFILTRATION; PERINEURAL ONCE
Status: ACTIVE | OUTPATIENT
Start: 2018-11-01 | End: 2018-11-01

## 2018-11-01 RX ORDER — ONDANSETRON 4 MG/1
4 TABLET, FILM COATED ORAL
Qty: 30 TAB | Refills: 0 | Status: SHIPPED | OUTPATIENT
Start: 2018-11-01

## 2018-11-01 RX ORDER — AMOXICILLIN AND CLAVULANATE POTASSIUM 875; 125 MG/1; MG/1
1 TABLET, FILM COATED ORAL 2 TIMES DAILY
Qty: 10 TAB | Refills: 0 | Status: SHIPPED | OUTPATIENT
Start: 2018-11-01 | End: 2018-11-06

## 2018-11-01 RX ADMIN — PIPERACILLIN SODIUM,TAZOBACTAM SODIUM 3.38 G: 3; .375 INJECTION, POWDER, FOR SOLUTION INTRAVENOUS at 08:57

## 2018-11-01 RX ADMIN — ASPIRIN 81 MG CHEWABLE TABLET 81 MG: 81 TABLET CHEWABLE at 08:57

## 2018-11-01 RX ADMIN — PIPERACILLIN SODIUM,TAZOBACTAM SODIUM 3.38 G: 3; .375 INJECTION, POWDER, FOR SOLUTION INTRAVENOUS at 00:45

## 2018-11-01 RX ADMIN — ENOXAPARIN SODIUM 40 MG: 40 INJECTION, SOLUTION INTRAVENOUS; SUBCUTANEOUS at 08:57

## 2018-11-01 NOTE — PROGRESS NOTES
Bedside shift change report given Aziza VEE . Report included the following information SBAR. 
  
Zone SFRSK: 3271 
  
  
Significant changes during shift:None  
  
Patient Information 
  
Nicki Cabrales 
41 y.o. 
10/28/2018 11:56 PM by Willian Lima MD. Kiran Guerrero was admitted from Home 
  
Problem List 
  
       
Patient Active Problem List  
  Diagnosis Date Noted  SIRS (systemic inflammatory response syndrome) ([facility] 10/29/2018  
  
       
Past Medical History:  
Diagnosis Date  Cancer ([facility]    
  
  
  
Core Measures: 
  
CVA: Yes  
  
Activity Status: 
  
OOB to Chair No 
Ambulated this shift No  
Bed Rest No 
  
Supplemental D5: (TW Applicable) 
  
Room air 
  
  
LINES AND DRAINS: 
  
CDVT prophylaxis: 
  
DVT prophylaxis Med- Yes DVT prophylaxis SCD or JUSTA- No  
  
Wounds: (If Applicable) 
  
Wounds- No 
  
Location  
  
Patient Safety: 
  
Falls Score Total Score: 4 Safety Level_______ Bed Alarm On? Yes Sitter? No 
  
Plan for upcoming shift: CT abd/ pelvis and CT chest 
  
  
Discharge Plan: TBD after testing and evals 
  
Active Consults: 
IP CONSULT TO HOSPITALIST 
IP CONSULT TO NEUROLOGY 
IP CONSULT TO ONCOLOGY

## 2018-11-01 NOTE — PROGRESS NOTES
Hematology Oncology Progress Note Follow up for: Met melanoma Chart notes reviewed since last visit. Case discussed with following: 
Patient complains of the following: Patient sleeping Additional concerns noted by the staff:  
 
Patient Vitals for the past 24 hrs: 
 BP Temp Pulse Resp SpO2  
11/01/18 0742 96/61 98.5 °F (36.9 °C) (!) 102 20 98 % 11/01/18 0431 102/65 99.7 °F (37.6 °C) (!) 104 20 100 % 10/31/18 2322 99/60 99.8 °F (37.7 °C) (!) 103 20 97 % 10/31/18 1958 106/65 99.9 °F (37.7 °C) (!) 101 20 100 % 10/31/18 1549 98/64 98 °F (36.7 °C) 96 20 98 % 10/31/18 1246 97/69 97.5 °F (36.4 °C) 94 20 100 % Physical Examination: 
Gen NAD Labs: 
Recent Results (from the past 24 hour(s)) GLUCOSE, POC Collection Time: 10/31/18 11:57 AM  
Result Value Ref Range Glucose (POC) 110 (H) 65 - 100 mg/dL Performed by Helen Gonsalez (PCT) METABOLIC PANEL, BASIC Collection Time: 11/01/18  4:13 AM  
Result Value Ref Range Sodium 134 (L) 136 - 145 mmol/L Potassium 3.2 (L) 3.5 - 5.1 mmol/L Chloride 101 97 - 108 mmol/L  
 CO2 21 21 - 32 mmol/L Anion gap 12 5 - 15 mmol/L Glucose 82 65 - 100 mg/dL BUN 3 (L) 6 - 20 MG/DL Creatinine 0.66 (L) 0.70 - 1.30 MG/DL  
 BUN/Creatinine ratio 5 (L) 12 - 20 GFR est AA >60 >60 ml/min/1.73m2 GFR est non-AA >60 >60 ml/min/1.73m2 Calcium 9.3 8.5 - 10.1 MG/DL  
CBC WITH AUTOMATED DIFF Collection Time: 11/01/18  4:13 AM  
Result Value Ref Range WBC 11.1 4.1 - 11.1 K/uL  
 RBC 3.49 (L) 4.10 - 5.70 M/uL HGB 9.0 (L) 12.1 - 17.0 g/dL HCT 28.0 (L) 36.6 - 50.3 % MCV 80.2 80.0 - 99.0 FL  
 MCH 25.8 (L) 26.0 - 34.0 PG  
 MCHC 32.1 30.0 - 36.5 g/dL  
 RDW 12.9 11.5 - 14.5 % PLATELET 158 (H) 412 - 400 K/uL MPV 9.2 8.9 - 12.9 FL  
 NRBC 0.0 0  WBC ABSOLUTE NRBC 0.00 0.00 - 0.01 K/uL NEUTROPHILS 59 32 - 75 % LYMPHOCYTES 18 12 - 49 % MONOCYTES 10 5 - 13 % EOSINOPHILS 12 (H) 0 - 7 % BASOPHILS 1 0 - 1 % IMMATURE GRANULOCYTES 1 (H) 0.0 - 0.5 % ABS. NEUTROPHILS 6.5 1.8 - 8.0 K/UL  
 ABS. LYMPHOCYTES 2.0 0.8 - 3.5 K/UL  
 ABS. MONOCYTES 1.1 (H) 0.0 - 1.0 K/UL  
 ABS. EOSINOPHILS 1.4 (H) 0.0 - 0.4 K/UL  
 ABS. BASOPHILS 0.1 0.0 - 0.1 K/UL  
 ABS. IMM. GRANS. 0.1 (H) 0.00 - 0.04 K/UL  
 DF AUTOMATED    
 
MRI brain No evidence of intracranial metastatic disease. 
  
No intracranial mass, hemorrhage or evidence of acute infarction. Normal MRI of the brain. 
  
Maxillary sinus disease. 
  
No aneurysm, dissection, hemodynamically significant stenosis or major vessel occlusion Assessment and Plan:  
Met melanoma 
-admitted with some confusion-better 
-records from [facility] reviewed 
-was on ipi/nivo for 5 cycles 
-PET after 5 cycles showed progression or possible pseudoprogression 
-Patient to f/u with [facility] after repeat scans to determine treatment 
-MRI of brain negative He needs to f/u with his oncologists at [facility] and at [facility]

## 2018-11-01 NOTE — DISCHARGE SUMMARY
Hospitalist Discharge Summary     Patient ID:  Danny Cowden  [id number]  25 y.o.  1980    PCP on record: Rebecca Gilmore MD    Admit date: 10/28/2018  Discharge date and time: 11/1/2018      DISCHARGE DIAGNOSIS:  SIRS  Acute Encephalopathy   Dehydration  Rt groin pain s/p surgery   Hyponatremia    CONSULTATIONS:  IP CONSULT TO HOSPITALIST  IP CONSULT TO NEUROLOGY  IP CONSULT TO ONCOLOGY  IP CONSULT TO GENERAL SURGERY    Excerpted HPI from H&P of Vianey Caal MD:  Danny Cowden is a 41 y.o.  male with PMHx significant for melanoma with metastatic disease, presents via private vehicle to the ED with cc of altered mental status and lethargy for one day. The pt's mother came to visit him today and she was concerned that he was thinking slowly and \"not acting like himself\". He seemed confused. The pt's mother planned to take him home to watch him for the night but she became concerned by his confusion on the drive home and took him here to the ED. The pt was apparently diagnosed with melanoma in his R popliteal fossa in May. He underwent lymph node biopsy at a hospital in the [facility] in June or July and has been receiving some form of immunotherapy for several months out of [place] and has recently been reffered to [facility]. The pt was seen at [facility] and has not initiated therapy there yet.      The pt is not sure when his last treatment was, he thinks possibly three weeks ago, does not know the name of his treatment regimen, the name of his oncologist, or the duration of his therapy.      When pt examined in room - mild slurred speech and facial droop - mother think it is new         We were asked to admit for work up and evaluation of the above problems. ______________________________________________________________________  DISCHARGE SUMMARY/HOSPITAL COURSE:  for full details see H&P, daily progress notes, labs, consult notes.      SIRS with leukocytosis, tachycardia and hypotension and fevers at home POA with lactic acidosis POA  Acute encephalopathy POA  Dehydration POA  in settings of metastatic melanoma  Sepsis ruled out  Temp of 100 intermittently running for past 2 months  Seems all related to tumor burden  No clear source of infection  Appreciate surgery and oncology input  I spoke to his cancer institute in [address] and Javi Brown and claims this is all cancer and recommended again drainage of collections  He had been taken of chemo recent  He had been referred to [facility] for worsening cancer for higher level of care. I recommended them to keep the appointment on 7th and take images from the hospital to facilitate the therapy  UA and CXR negative  Blood cultures remain negative  MRI brain without acute CVA or mets  Will switch Zosyn to Augmentin for complete Px coarse of abx for ? Cellulitis on lymph node resection site on leg  Mental status now at baseline    Acute Encephalopathy POA  Suspect due to dehydration   R/o infectious etiology due to immunocompromised status  Pt reported low grade fever around 100 for last couple of month, fevers could be due to tumor fever  R/o groin abscess and cellulitis, will ask surgery consult. US with Hematoma or complex seroma adjacent to the common femoral vein  UDS positive for opiates and THC     Rt groin pain s/p surgery   US with Hematoma or complex seroma adjacent to the common femoral vein   Doubt infection  Appreciate surgery input/help     Hyponatremia  Due to dehydration  Resolved with ivf  _______________________________________________________________________  Patient seen and examined by me on discharge day. Pertinent Findings:  Gen:    Not in distress  Chest: Clear lungs  CVS:   Regular rhythm.   No edema  Abd:  Soft, not distended, not tender  Neuro:  Alert, non focal  _______________________________________________________________________  DISCHARGE MEDICATIONS:   Current Discharge Medication List      START taking these medications    Details   ondansetron hcl (ZOFRAN) 4 mg tablet Take 1 Tab by mouth every eight (8) hours as needed for Nausea. Qty: 30 Tab, Refills: 0      amoxicillin-clavulanate (AUGMENTIN) 875-125 mg per tablet Take 1 Tab by mouth two (2) times a day for 5 days. Qty: 10 Tab, Refills: 0             My Recommended Diet, Activity, Wound Care, and follow-up labs are listed in the patient's Discharge Insturctions which I have personally completed and reviewed. ______________________________________________________________________    Risk of deterioration: Moderate    Condition at Discharge:  Stable  ______________________________________________________________________    Disposition  Home with family, no needs  ______________________________________________________________________    Care Plan discussed with:   Patient, Family, RN, Consultant    ______________________________________________________________________    Code Status: Full Code  ______________________________________________________________________      Follow-up Information     Follow up With Specialties Details Why Contact Info    Other, MD Rebecca    Patient can only remember the practice name and not the physician      [facility]OG[facility]   As you already have an appointment on 7th     Dr Jaki Martinez   they want to see you on Nov 13. Please call and make appointment if you don't hear from them Teresa Caicedo.  The oncology that you had been following    thyroid function tests   Ask your primary care to check in 6 weeks             Total time in minutes spent coordinating this discharge (includes going over instructions, follow-up, prescriptions, and preparing report for sign off to her PCP) :  35 minutes    Signed:  Pati Alvarado MD

## 2018-11-01 NOTE — PROGRESS NOTES
Pharmacy Automatic Renal Dosing Protocol - Antimicrobials Indication for Antimicrobials: Groin Abscess? Cellulitis? Current Regimen of Each Antimicrobial: 
Zosyn 3.375g IV Q8H (Start 10/31/18 - Day 2) Previous Antimicrobial Therapy: 
Cefepime 2gm IV q8h - started 10/29 days 2 Levofloxacin 750mg IV q24h - started 10/29 day 2 Significant Cultures:  
10/29 influenza - negative 10/29 PBCx - Ng - Prelim Radiology / Imaging results: (X-ray, CT scan or MRI):  
10/29 CT head -  unremarkable 10/29 CXR- lungs clear Paralysis, amputations, malnutrition: none Labs: 
Recent Labs 18 
0413 10/31/18 
1947 10/30/18 
1296 CREA 0.66* 0.64* 0.65* BUN 3* 4* 10 WBC 11.1 9.2 9.2 Temp (24hrs), Av.9 °F (37.2 °C), Min:97.5 °F (36.4 °C), Max:99.9 °F (37.7 °C) Creatinine Clearance (mL/min) or Dialysis:  
Estimated Creatinine Clearance: 148.3 mL/min (A) (based on SCr of 0.66 mg/dL (L)). Estimated Creatinine Clearance (using IBW):148.3 mL/min Impression/Plan: · Now on Zosyn for suspected abscess. Possibly being drained this evening by Dr. Roman Level · Antimicrobial stop date - 18 Pharmacy will follow daily and adjust medications as appropriate for renal function and/or serum levels. Thank you, Sher Mulligan PHARMD 
 
Recommended duration of therapy 
http://[facility]/[place]/[facility]/Pharmacy/Clinical%20Companion/Duration%20of%20ABX%20therapy. docx Renal Dosing 
http://[facility]/[place]/[facility]/Pharmacy/Clinical%20Companion/Renal%[id number]. pdf

## 2018-11-01 NOTE — PROGRESS NOTES
Patient provided with discharge instructions. Patient verbalized understanding of follow up appointments, medical records, new prescriptions and discharge diet and activity. Patient discharged home with mother via wheelchair.

## 2018-11-01 NOTE — PROGRESS NOTES
CM met with pt and pt's mother and discussed discharge plan. Pt will be going home with his mother and live with his parents. Pt's parents will bring him to his medical appts. CM provided pt's mother with the medical records release form. Discussed BS care card application with mother and provided an additional copy for her. CM sent request to [employer] yesterday to screen pt for Medicaid. Care Management Interventions PCP Verified by CM: Yes([facility] in [place]) Mode of Transport at Discharge: Other (see comment)(pt's mother will transport by car) Transition of Care Consult (CM Consult): Discharge Planning Discharge Durable Medical Equipment: No 
Physical Therapy Consult: Yes Occupational Therapy Consult: Yes Speech Therapy Consult: Yes Current Support Network: Lives Alone, Own Home(lives alone in a 1 story home ) Confirm Follow Up Transport: Family Plan discussed with Pt/Family/Caregiver: Yes Discharge Location Discharge Placement: [place] [facility]

## 2018-11-01 NOTE — DISCHARGE INSTRUCTIONS
HOSPITALIST DISCHARGE INSTRUCTIONS    NAME: Danny Cowden   :  1980   MRN:  [id number]     Date/Time:  2018 1:34 PM    ADMIT DATE: 10/28/2018     DISCHARGE DATE: 2018     DISCHARGE DIAGNOSIS:  SIRS  Acute Encephalopathy   Dehydration  Rt groin pain s/p surgery   Hyponatremia    MEDICATIONS:  · It is important that you take the medication exactly as they are prescribed. · Keep your medication in the bottles provided by the pharmacist and keep a list of the medication names, dosages, and times to be taken in your wallet. · Do not take other medications without consulting your doctor. Pain Management: per above medications    What to do at Home    Recommended diet:  Regular Diet    Recommended activity: Activity as tolerated    If you have questions regarding the hospital related prescriptions or hospital related issues [phone]. If you experience any of the following symptoms then please call your primary care physician or return to the emergency room if you cannot get hold of your doctor:  Fever, chills, nausea, vomiting, diarrhea, change in mentation, falling, bleeding, shortness of breath      Information obtained by :  I understand that if any problems occur once I am at home I am to contact my physician. I understand and acknowledge receipt of the instructions indicated above.                                                                                                                                            Physician's or R.N.'s Signature                                                                  Date/Time                                                                                                                                              Patient or Representative Signature                                                          Date/Time

## 2018-11-02 NOTE — PROGRESS NOTES
[facility] Volunteer visited patient in Neuroscience Telemetry Unit on 11/1/2018. Documented 11/2/2018 by: 
 
Rev. Michael Trivedi, [facility]  paging service: [phone])

## 2018-11-03 LAB
BACTERIA SPEC CULT: NORMAL
SERVICE CMNT-IMP: NORMAL

## 2021-01-29 NOTE — ED PROVIDER NOTES
EMERGENCY DEPARTMENT HISTORY AND PHYSICAL EXAM 
 
 
Date: 10/28/2018 Patient Name: Patsy Apodaca History of Presenting Illness Chief Complaint Patient presents with  Lethargy  
  mother reports being treated for cancer and has increased lethargy and confusion for past 2-3 weeks History Provided By: Patient and pts mother HPI: Patsy Apodaca, 41 y.o. male with PMHx significant for melanoma with metastatic disease, presents via private vehicle to the ED with cc of altered mental status and lethargy for one day. The pt's mother came to visit him today and she was concerned that he was thinking slowly and \"not acting like himself\". He seemed confused. The pt's mother planned to take him home to watch him for the night but she became concerned by his confusion on the drive home and took him here to the ED. The pt was apparently diagnosed with melanoma in his R popliteal fossa in May. He underwent lymph node biopsy at a hospital in the [address] [place] [address] in June or July and has been receiving some form of immunotherapy for several months out of [place] and has recently been reffered to [facility]. The pt was seen at [facility] and has not initiated therapy there yet. The pt is not sure when his last treatment was, he thinks possibly three weeks ago, does not know the name of his treatment regimen, the name of his oncologist, or the duration of his therapy. The pt thinks that he has felt \"strange\" today. He complains of new exertional dyspnea, but denies CP or SOB at rest. He complains of thinking more slowly than usual but denies focal numbenss, weakness, change in voice or sensation. The pt's family says he has been thinking more slowly since last week but was acutely more confused this afternoon.   
 
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 Chief Complaint: Francy Carbon Duration: 3 Days Timing:  Worsening Location: generl Quality: non focal 
Severity: Moderate Modifying Factors: none Associated Symptoms: denies any other associated signs or symptoms There are no other complaints, changes, or physical findings at this time. PCP: Mando, MD Rebecca 
 
Current Facility-Administered Medications Medication Dose Route Frequency Provider Last Rate Last Dose  cefepime (MAXIPIME) 2 g in 0.9% sodium chloride (MBP/ADV) 100 mL  2 g IntraVENous Q8H Sharon López MD      
 levoFLOXacin [facility]) 750 mg in D5W IVPB  750 mg IntraVENous Q24H DEYANIRA'Sharon Hall  mL/hr at 10/29/18 0501 750 mg at 10/29/18 0501 Past History Past Medical History: 
Past Medical History:  
Diagnosis Date  Cancer ([facility] [address] Past Surgical History: 
History reviewed. No pertinent surgical history. Family History: 
History reviewed. No pertinent family history. Social History: 
Social History Tobacco Use  Smoking status: Never Smoker  Smokeless tobacco: Never Used Substance Use Topics  Alcohol use: No  
  Frequency: Never  Drug use: No  
 
 
Allergies: 
No Known Allergies Review of Systems Review of Systems Constitutional: Negative for activity change, appetite change and diaphoresis. HENT: Negative for congestion, dental problem, drooling and ear discharge. Eyes: Negative for discharge and itching. Respiratory: Negative for apnea, cough, choking, chest tightness, shortness of breath, wheezing and stridor. Cardiovascular: Negative for chest pain, palpitations and leg swelling. Gastrointestinal: Negative for abdominal distention, abdominal pain, anal bleeding, constipation, diarrhea, nausea and vomiting. Endocrine: Negative for cold intolerance and heat intolerance. Genitourinary: Negative for difficulty urinating and hematuria. Musculoskeletal: Negative for back pain and joint swelling. Skin: Negative for color change and pallor. Allergic/Immunologic: Negative for immunocompromised state. Neurological: Negative for dizziness, seizures, facial asymmetry, speech difficulty and numbness. Feels not like himself, slow thinking Psychiatric/Behavioral: Positive for confusion. Negative for agitation and behavioral problems. Physical Exam  
Physical Exam  
Constitutional: He is oriented to person, place, and time. He appears well-developed and well-nourished. No distress. pale HENT:  
Head: Normocephalic and atraumatic. Mouth/Throat: No oropharyngeal exudate. Eyes: Conjunctivae are normal. Pupils are equal, round, and reactive to light. Right eye exhibits no discharge. Left eye exhibits no discharge. No scleral icterus. Neck: Normal range of motion. No tracheal deviation present. No thyromegaly present. Cardiovascular: Normal heart sounds. No murmur heard. Pulmonary/Chest: Effort normal and breath sounds normal.  
Abdominal: Soft. Bowel sounds are normal.  
Musculoskeletal: Normal range of motion. He exhibits no tenderness or deformity. Neurological: He is alert and oriented to person, place, and time. No cranial nerve deficit. Skin: Skin is warm and dry. He is not diaphoretic. Psychiatric: He has a normal mood and affect. Nursing note and vitals reviewed. Diagnostic Study Results Labs - Recent Results (from the past 12 hour(s)) CBC WITH AUTOMATED DIFF Collection Time: 10/28/18 10:01 PM  
Result Value Ref Range WBC 14.3 (H) 4.1 - 11.1 K/uL  
 RBC 4.46 4.10 - 5.70 M/uL  
 HGB 11.6 (L) 12.1 - 17.0 g/dL HCT 37.0 36.6 - 50.3 % MCV 83.0 80.0 - 99.0 FL  
 MCH 26.0 26.0 - 34.0 PG  
 MCHC 31.4 30.0 - 36.5 g/dL  
 RDW 12.9 11.5 - 14.5 % PLATELET 166 (H) 595 - 400 K/uL MPV 9.0 8.9 - 12.9 FL  
 NRBC 0.0 0  WBC ABSOLUTE NRBC 0.00 0.00 - 0.01 K/uL NEUTROPHILS 57 32 - 75 % LYMPHOCYTES 23 12 - 49 % MONOCYTES 8 5 - 13 % EOSINOPHILS 11 (H) 0 - 7 % BASOPHILS 1 0 - 1 % IMMATURE GRANULOCYTES 0 0.0 - 0.5 % ABS. NEUTROPHILS 8.2 (H) 1.8 - 8.0 K/UL  
 ABS. LYMPHOCYTES 3.3 0.8 - 3.5 K/UL  
 ABS. MONOCYTES 1.1 (H) 0.0 - 1.0 K/UL  
 ABS. EOSINOPHILS 1.6 (H) 0.0 - 0.4 K/UL  
 ABS. BASOPHILS 0.1 0.0 - 0.1 K/UL  
 ABS. IMM. GRANS. 0.0 0.00 - 0.04 K/UL  
 DF SMEAR SCANNED    
 RBC COMMENTS NORMOCYTIC, NORMOCHROMIC    
 WBC COMMENTS ATYPICAL LYMPHOCYTES PRESENT    
METABOLIC PANEL, COMPREHENSIVE Collection Time: 10/28/18 10:01 PM  
Result Value Ref Range Sodium 131 (L) 136 - 145 mmol/L Potassium 4.2 3.5 - 5.1 mmol/L Chloride 96 (L) 97 - 108 mmol/L  
 CO2 21 21 - 32 mmol/L Anion gap 14 5 - 15 mmol/L Glucose 83 65 - 100 mg/dL BUN 18 6 - 20 MG/DL Creatinine 0.93 0.70 - 1.30 MG/DL  
 BUN/Creatinine ratio 19 12 - 20 GFR est AA >60 >60 ml/min/1.73m2 GFR est non-AA >60 >60 ml/min/1.73m2 Calcium 12.2 (H) 8.5 - 10.1 MG/DL Bilirubin, total 0.7 0.2 - 1.0 MG/DL  
 ALT (SGPT) 19 12 - 78 U/L  
 AST (SGOT) 32 15 - 37 U/L Alk. phosphatase 82 45 - 117 U/L Protein, total 9.2 (H) 6.4 - 8.2 g/dL Albumin 3.2 (L) 3.5 - 5.0 g/dL Globulin 6.0 (H) 2.0 - 4.0 g/dL A-G Ratio 0.5 (L) 1.1 - 2.2 EKG, 12 LEAD, INITIAL Collection Time: 10/29/18 12:15 AM  
Result Value Ref Range Ventricular Rate 104 BPM  
 Atrial Rate 104 BPM  
 P-R Interval 160 ms QRS Duration 82 ms Q-T Interval 342 ms QTC Calculation (Bezet) 449 ms Calculated P Axis 41 degrees Calculated R Axis 28 degrees Calculated T Axis 28 degrees Diagnosis Sinus tachycardia No previous ECGs available LACTIC ACID Collection Time: 10/29/18 12:52 AM  
Result Value Ref Range Lactic acid 2.4 (HH) 0.4 - 2.0 MMOL/L  
URINALYSIS W/ REFLEX CULTURE Collection Time: 10/29/18  2:45 AM  
Result Value Ref Range Color YELLOW/STRAW Appearance CLEAR CLEAR Specific gravity 1.017 1.003 - 1.030    
 pH (UA) 5.5 5.0 - 8.0 Protein NEGATIVE  NEG mg/dL Glucose NEGATIVE  NEG mg/dL Ketone 40 (A) NEG mg/dL Blood NEGATIVE  NEG Urobilinogen 1.0 0.2 - 1.0 EU/dL Nitrites NEGATIVE  NEG Leukocyte Esterase NEGATIVE  NEG    
 WBC 0-4 0 - 4 /hpf  
 RBC 0-5 0 - 5 /hpf Epithelial cells FEW FEW /lpf Bacteria NEGATIVE  NEG /hpf  
 UA:UC IF INDICATED CULTURE NOT INDICATED BY UA RESULT CNI Hyaline cast >20 (H) 0 - 5 /lpf  
BILIRUBIN, CONFIRM Collection Time: 10/29/18  2:45 AM  
Result Value Ref Range Bilirubin UA, confirm NEGATIVE  NEG    
LACTIC ACID Collection Time: 10/29/18  4:30 AM  
Result Value Ref Range Lactic acid 0.8 0.4 - 2.0 MMOL/L  
INFLUENZA A & B AG (RAPID TEST) Collection Time: 10/29/18  4:30 AM  
Result Value Ref Range Influenza A Antigen NEGATIVE  NEG Influenza B Antigen NEGATIVE  NEG Radiologic Studies -  
CT HEAD WO CONT Final Result XR CHEST PA LAT Final Result CT Results  (Last 48 hours) 10/29/18 0113  CT HEAD WO CONT Final result Impression:  IMPRESSION: Unremarkable CT of the head. Narrative:  EXAM:  CT HEAD WO CONT INDICATION:   Confusion/delirium, altered LOC, unexplained, lethargy and  
confusion. Symptoms for 2 weeks. COMPARISON: None. CONTRAST:  None. TECHNIQUE: Unenhanced CT of the head was performed using 5 mm images. Brain and  
bone windows were generated. CT dose reduction was achieved through use of a  
standardized protocol tailored for this examination and automatic exposure  
control for dose modulation. FINDINGS:  
The ventricles and sulci are normal in size, shape and configuration and  
midline. There is no significant white matter disease. There is no intracranial  
hemorrhage, extra-axial collection, mass, mass effect or midline shift.   The  
 basilar cisterns are open. No acute infarct is identified. The bone windows  
demonstrate no abnormalities. The visualized portions of the paranasal sinuses  
and mastoid air cells are clear. CXR Results  (Last 48 hours) 10/29/18 0042  XR CHEST PA LAT Final result Impression:  IMPRESSION:  
NORMAL CHEST. Narrative:  History: Chest pain. Frontal and lateral views of the chest show clear lungs. The heart, mediastinum  
and pulmonary vasculature are normal.  The bony thorax is unremarkable. Medical Decision Making I am the first provider for this patient. I reviewed the vital signs, available nursing notes, past medical history, past surgical history, family history and social history. Vital Signs-Reviewed the patient's vital signs. Patient Vitals for the past 12 hrs: 
 Temp Pulse Resp BP SpO2  
10/29/18 0506 99.2 °F (37.3 °C) (!) 103 18 90/57 98 % 10/29/18 0500  (!) 102 26 90/57 98 % 10/29/18 0430  (!) 101 20 94/63 97 % 10/29/18 0400  (!) 103  92/61   
10/29/18 0345  (!) 102     
10/29/18 0330  (!) 107  100/63   
10/29/18 0315  (!) 107     
10/29/18 0300  (!) 105  107/71   
10/29/18 0230 100.2 °F (37.9 °C) (!) 105 20 105/70   
10/29/18 0200  (!) 107 24 100/67   
10/29/18 0130  (!) 110 21 101/66   
10/29/18 0100  (!) 109 20 102/72 98 % 10/29/18 0056  (!) 109 19  94 % 10/29/18 0047  (!) 107 16 106/72 96 % 10/28/18 2147 98.7 °F (37.1 °C) (!) 127 14 106/70 99 % Pulse Oximetry Analysis - 99% on RA Cardiac Monitor:  
Rate: 106 bpm 
Rhythm: Sinus Tachycardia EKG interpretation: (Preliminary) Rhythm: sinus tachycardia; and regular . Rate (approx.): 104; Axis: normal; IL interval: normal; QRS interval: normal ; ST/T wave: normal; Other findings: normal. 
Written by Aj Arango, Records Reviewed: no records on file, records requested from [facility] and [facility] Provider Notes (Medical Decision Making):  
42 yo M with cancer hx on some form of immuno therapy here with vague complaints of feeling unwell, Ams, exertional dyspnea, and mild abd discomfort. Unknown other medical hx,m unknown meds. Tachicardic on arrival.  Will treat as septic until proven otherwise based on presumed immunosuppression. Head CT to eval for intracranial cause of AMS. Will attempt to get records from OSH to figure out pt's baseline, therapies, meds and med hx. ED Course:  
Initial assessment performed. The patients presenting problems have been discussed, and they are in agreement with the care plan formulated and outlined with them. I have encouraged them to ask questions as they arise throughout their visit. 1:32 AM 
Records obtained from OSH. Pt results sig for leukocytosis, and elevated lactate. HR decreasing in response to fluids. Pt is on dual immuno therapy with opdivo and yervoy. More common SE include metabolic and hepatic toxicity but lytes and transaminases are WNL. There are rare reports of encephalitis that may be related to the immunotherapy reported in the drug information. Pt records indicate that he has had a normal brain MRI but the date and official read of this study are not included in the records received. Pt being admitted to medicine. CONSULT NOTE:  
3:37 AM 
April Joselito Escobar MD spoke with Dr Freddie Connell, Specialty: Hospitalist 
Discussed pt's hx, disposition, and available diagnostic and imaging results. Reviewed care plans. Consultant will evaluate pt for admission. Written by Paco Angeles ED Scribe, as dictated by Alber Castillo MD. Disposition: 
 
Admit Note: 
3:38 AM 
Pt is being admitted by Dr. Freddie Connell. The results of their tests and reason(s) for their admission have been discussed with pt and/or available family. They convey agreement and understanding for the need to be admitted and for admission diagnosis.  
 
PLAN: 
 1. Admit for obs and continued resuscitation. Diagnosis Clinical Impression: 1. Lethargy 2. Dehydration 3. Altered mental status 4. SIRS I personally saw and examined the patient. I have reviewed and agree with the residents findings, including all diagnostic interpretations, and plans as written. I was present during the key portions of separately billed procedures.  
Michael Tanner MD 
 
 
 
 Rhofade Pregnancy And Lactation Text: This medication has not been assigned a Pregnancy Risk Category. It is unknown if the medication is excreted in breast milk.